# Patient Record
Sex: FEMALE | Race: OTHER | HISPANIC OR LATINO | URBAN - METROPOLITAN AREA
[De-identification: names, ages, dates, MRNs, and addresses within clinical notes are randomized per-mention and may not be internally consistent; named-entity substitution may affect disease eponyms.]

---
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
## 2021-07-11 ENCOUNTER — EMERGENCY (EMERGENCY)
Facility: HOSPITAL | Age: 21
LOS: 0 days | Discharge: HOME | End: 2021-07-11
Attending: EMERGENCY MEDICINE
Payer: OTHER MISCELLANEOUS

## 2021-07-11 VITALS
TEMPERATURE: 98 F | RESPIRATION RATE: 17 BRPM | SYSTOLIC BLOOD PRESSURE: 111 MMHG | OXYGEN SATURATION: 99 % | DIASTOLIC BLOOD PRESSURE: 66 MMHG | HEART RATE: 70 BPM

## 2021-07-11 VITALS — HEIGHT: 62 IN | WEIGHT: 115.08 LBS

## 2021-07-11 DIAGNOSIS — S61.210A LACERATION WITHOUT FOREIGN BODY OF RIGHT INDEX FINGER WITHOUT DAMAGE TO NAIL, INITIAL ENCOUNTER: ICD-10-CM
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
## 2021-07-11 PROCEDURE — 12001 RPR S/N/AX/GEN/TRNK 2.5CM/<: CPT

## 2021-07-11 PROCEDURE — 99283 EMERGENCY DEPT VISIT LOW MDM: CPT | Mod: 25

## 2021-07-11 PROCEDURE — 73130 X-RAY EXAM OF HAND: CPT | Mod: RT

## 2021-07-11 PROCEDURE — 73130 X-RAY EXAM OF HAND: CPT | Mod: 26,RT

## 2021-07-11 NOTE — ED ADULT TRIAGE NOTE - CHIEF COMPLAINT QUOTE
patient presents with approx. 3cm laceration to the R. 1st finger after dropping a cup, no active bleeding in triage. UTD on tetanus

## 2021-07-11 NOTE — ED STATDOCS - PROGRESS NOTE DETAILS
X-ray of right hand index finger no fracture. Nedra HUNT Repair of laceration done and documented on procedure note. Agreed to f/U with PMD in 7 days for suture removal. return instructions for any signs of infection. ( used ) Nedra HUNT

## 2021-07-11 NOTE — ED STATDOCS - OBJECTIVE STATEMENT
19 y/o female with no pertinent PMHx presents to ED c/o laceration to 2nd right digit s/p broken glass falling on hand. Tetanus not UTD. States glass fell on finger around 3PM, still has difficulty controlling bleeding. NKDA.

## 2021-07-11 NOTE — ED ADULT NURSE NOTE - OBJECTIVE STATEMENT
Indication: Dementia. Mental status change.



Comparison: CT brain of one day prior.



Technique: ybuy Optima 1.5 Jesica TV258Y with GEM suite.  MRI brain without contrast. 



Report: Diffusion series is negative for acute or subacute ischemia. Susceptibility series

is negative for stigmata of hemosiderin deposition to indicate previous hemorrhage.

Moderate prominence of the cerebral sulci reflecting volume loss. Unremarkable ventricles

and basal cisterns.



Increased T2 signal in the periventricular and subcortical white matter without mass

effect most consistent with chronic small vessel ischemic disease. No intra or extra-axial

fluid collection evident. Unremarkable orbital contents.



Preserved major intracranial flow-voids.



Indolent thickening of the inner table of the frontal bone. No suspicious calvarial or

skull base fractures evident. Clear paranasal sinuses and mastoid air spaces. Unremarkable

scalp.



IMPRESSION: Involutional change and stigmata of chronic small vessel ischemic disease. No

evidence for acute or subacute ischemia or other acute intracranial process. Patient presents to ED c/o laceration to 2nd right digit s/p broken glass falling on hand. Tetanus not UTD. States glass fell on finger around 3PM, still has difficulty controlling bleeding.

## 2021-07-11 NOTE — ED STATDOCS - SKIN, MLM
skin normal color for race, warm, dry and intact. +2cm laceration to mid phalanx of right hand 2nd digit, able to flex and extend, normal sensation

## 2021-07-11 NOTE — ED STATDOCS - NSFOLLOWUPINSTRUCTIONS_ED_ALL_ED_FT
Impacted Molar       Molars are the teeth in the back of the mouth. When they push out from the gum and grow (erupt), they can become trapped inside the gum, or they may only partially come through the gum surface (become impacted).    Molars erupt at different times in life. The first set of molars usually erupts at about 6–7 years of age. The second set of molars typically erupts at about 11–13 years of age. The third set of molars usually erupts at about 17–21 years of age. This set of molars is sometimes referred to as wisdom teeth. [place] teeth are most often affected by this condition, although any molar or set of molars can become impacted.  Impacted molars may increase your risk of developing:  •Infection.      •Damage to nearby teeth.      •Growth of fluid-filled sacs (cysts).      •Long-lasting (chronic) discomfort.      •Inflammation of the surrounding gum tissue (pericoronitis).        What are the causes?    Common causes of this condition include having crowded teeth or a small mouth. This means that there may not be space for the molar to grow into. Other causes include a cyst or a mass (tumor).      What are the signs or symptoms?  Symptoms of this condition include:  •Pain.      •Inflammation near the impacted tooth or teeth.      •A stiff jaw.      •Bad breath.      •A gap between the teeth.      •Difficulty opening your mouth.      •A headache or jaw ache.      •Swollen lymph nodes.      •A bad taste in your mouth.      In some cases, there are no symptoms.      How is this diagnosed?    This condition can be diagnosed with an oral exam and X-rays.      How is this treated?  This condition is often treated by removing (extracting) the impacted molar or molars. Other treatment options include:  •A procedure to remove the gum tissue that covers the impacted molar.      •Repositioning the teeth so there is room for the molar to come through. This may be done with orthodontic devices, such as braces.      •Antibiotic medicines, if your impacted molar or set of molars has become infected.      •Medicines for pain, if needed.      Treatment may not be needed if you have no symptoms. Talk with your health care provider about what is best for you.      Follow these instructions at home:       Medicines      •Take over-the-counter and prescription medicines only as told by your health care provider.      •If you were prescribed an antibiotic medicine, take it as told by your health care provider. Do not stop taking the antibiotic even if you start to feel better.    •If you are taking prescription pain medicine, take actions to prevent or treat constipation. Your health care provider may recommend that you:  •Drink enough fluid to keep your urine pale yellow.      •Eat foods that are high in fiber, such as fresh fruits and vegetables, whole grains, and beans.      •Limit foods that are high in fat and processed sugars, such as fried or sweet foods.      •Take an over-the-counter or prescription medicine for constipation.        General instructions   •If directed, put ice on the painful area:  •Put ice in a plastic bag.      •Place a towel between your skin and the bag.      •Leave the ice on for 20 minutes, 2–3 times a day.      •Your health care provider may recommend that you rinse your mouth 3–4 times a day to help with pain, infection, or inflammation. You may use:  •An antibacterial solution. Use it as told by your health care provider.      •A salt-water rinse. To make a salt-water mixture, completely dissolve ½–1 tsp of salt in 1 cup of warm water.        •Keep all follow-up visits as told by your health care provider. This is important.        Contact a health care provider if you have:    •A fever.      •Symptoms that get worse.      •Pain that is not controlled with medicine.    •New symptoms, including:  •New facial swelling or numbness.      •Swelling along your gums.        •Difficulty opening your mouth.      •Difficulty swallowing.        Summary    •Molars are the teeth in the back of the mouth. When they push out from the gum and grow, they can become trapped inside the gum, or they may only partially come through the gum surface (become impacted).      •Impacted molars may increase the risk of developing infection, damage to nearby teeth, growth of fluid-filled sacs (cysts), chronic pain, or inflammation of the surrounding gum tissue.      •Common causes of this condition include having crowded teeth, a small mouth, a cyst, or a mass (tumor).      •This condition is often treated by removing (extracting) the impacted molar or molars. Other options include removing the gum tissue that covers the impacted molar, repositioning the teeth with braces, or taking antibiotic medicine for infection.      This information is not intended to replace advice given to you by your health care provider. Make sure you discuss any questions you have with your health care provider.    Rest. Elevate affected finger.  Change bandage daily. Clean with water then apply bacitracin ointment.  Follow up with PMD or return to ED for suture removal in 7 days. Cuidado de un desgarro, en adultos    Laceration Care, Adult      Un desgarro es un benny que puede atravesar todas las capas de la piel hasta el tejido que se encuentra debajo de la piel. Algunos desgarros cicatrizan por sí solos. Otros se deben cerrar con puntos (suturas), grapas, tiras adhesivas para la piel o goma para cerrar la piel. El cuidado adecuado de un desgarro reduce el riesgo de infección, ayuda a que el desgarro cierre mejor, y puede prevenir la formación de cicatrices.      Cómo cuidar del desgarro    Lávese las rani con agua y jabón antes de tocarse la herida y cambiar la venda (vendaje). Use desinfectante para rani si no dispone de agua y jabón.    Mantenga la herida limpia y seca.    Si le colocaron un vendaje, cámbielo al menos john vez al día o amaury se lo haya indicado el médico. También debe cambiarlo si se moja o se ensucia.    Si se utilizaron suturas o grapas:     •Mantenga la herida completamente seca kev las primeras 24 horas o amaury se lo haya indicado el médico. Transcurrido denae tiempo, puede ducharse o meredith delia de inmersión. No obstante, asegúrese de no sumergir la herida en agua hasta que le hayan quitado las suturas o las grapas.    •Limpie la herida john vez por día o amaury se lo haya indicado el médico:  •Lave la herida con agua y jabón.      •Enjuáguela con agua para quitar todo el jabón.      •Séquela dando palmaditas con john toalla limpia. No frote la herida.        •Después de limpiar la herida, aplique john delgada capa de ungüento con antibiótico amaury se lo haya indicado el médico. [facility] ayudará a prevenir infecciones y a evitar que el vendaje se adhiera a la herida.      •Abigail que le retiren las suturas o las grapas amaury se lo haya indicado el médico.      Si se utilizaron tiras adhesivas para la piel:     • No deje que las tiras adhesivas para la piel se mojen. Puede bañarse o ducharse, brynn tenga cuidado de no mojar la herida.      •Si se moja, séquela dando palmaditas con john toalla limpia. No frote la herida.      •Las tiras adhesivas para la piel se caen solas. Puede recortar las tiras a medida que la herida se denton. No quite las tiras adhesivas para la piel que aún estén pegadas a la herida. Estas se caerán cuando sea el momento.      Si se utilizó goma para cerrar la piel:     •Trate de mantener la herida seca; sin embargo, puede mojarla ligeramente cuando se bañe o se duche. No sumerja la herida en el agua, por ejemplo, al nadar.      •Después de ducharse o bañarse, seque la herida con cuidado dando palmaditas con john toalla limpia. No frote la herida.      • No practique actividades que lo marielos transpirar mucho hasta que la goma para cerrar la piel se haya caído kedar.      • No aplique líquidos, cremas ni ungüentos medicinales en la herida mientras todavía tenga la goma para cerrar la piel. De lo contrario, puede hacer que la goma se despegue antes de que la herida cicatrice.      •Si la herida está cubierta con un vendaje, tenga cuidado de no aplicar cinta adhesiva directamente sobre la goma para cerrar la piel. De lo contrario, puede hacer que la goma se despegue antes de que la herida cicatrice.      • No toque la goma. La goma para cerrar la piel generalmente permanece sobre la piel de 5 a 10 días y luego se  kedar.        Indicaciones generales      •[place] los medicamentos de venta breanna y los recetados solamente amaury se lo haya indicado el médico.      •Si le recetaron un medicamento o ungüento con antibiótico, tómelo o aplíqueselo amaury se lo haya indicado el médico. No deje de usarlo aunque la afección mejore.      • No se rasque ni se toque la herida.    •Controle la herida todos los días para detectar signos de infección. Esté atento a lo siguiente:  •Dolor, hinchazón o enrojecimiento.      •Líquido, dino o pus.        •Kev las primeras 24 a 48 horas después de que le hayan reparado el desgarro, cuando esté sentado o acostado, levante (eleve) la jaron de la lesión por encima del nivel del corazón.    •Si se lo indican, aplique hielo sobre la jaron afectada:  •Ponga el hielo en john bolsa plástica.      •Coloque john toalla entre la piel y la bolsa de hielo.      •Coloque el hielo kev 20 minutos, 2 a 3 veces por día.        •Concurra a todas las visitas de seguimiento amaury se lo haya indicado el médico. [facility] es importante.        Comuníquese con un médico si:    •Le colocaron la vacuna antitetánica y tiene hinchazón, dolor intenso, enrojecimiento o hemorragia en el sitio de la inyección.      •Tiene fiebre.      •John herida que estaba cerrada y se abre.      •Percibe que sale mal olor de la herida o del vendaje.      •Nota un cuerpo extraño en la herida, amaury un trozo de resendiz o jamee.      •El dolor no se ezequiel con los medicamentos.      •Presenta un aumento del enrojecimiento, la hinchazón o el dolor en el lugar de la herida.      •Presenta líquido, dino o pus que provienen de la herida.      •Debe cambiar el vendaje con frecuencia debido al drenaje de líquido, dino o pus proveniente de la herida.      •Presenta john nueva erupción cutánea.      •Presenta adormecimiento alrededor de la herida.        Solicite ayuda de inmediato si:    •Tiene mucha hinchazón alrededor de la herida.      •El dolor aumenta repentinamente y es intenso.      •Presenta nódulos dolorosos cerca de la herida o en la piel en cualquier jaron del cuerpo.      •Tiene john línea jazz que sale de la herida.      •La herida está en la mano o en el pie y no puede  correctamente zeina de los dedos.      •La herida está en la mano o en el pie y observa que los dedos tienen un carlee pálido o azulado.        Resumen    •Un desgarro es un benny que puede atravesar todas las capas de la piel hasta el tejido que se encuentra debajo de la piel.      •Algunos desgarros cicatrizan por sí solos. Otros se deben cerrar con puntos (suturas), grapas, tiras adhesivas para la piel o goma para cerrar la piel.      •El cuidado adecuado de un desgarro reduce el riesgo de infección, ayuda a que el desgarro cierre mejor, y previene la formación de cicatrices.      Esta información no tiene amaury fin reemplazar el consejo del médico. Asegúrese de hacerle al médico cualquier pregunta que tenga.          Rest. Elevate affected finger.  Change bandage daily. Clean with water then apply bacitracin ointment.  Follow up with PMD or return to ED for suture removal in 7 days.

## 2021-07-11 NOTE — ED STATDOCS - CARE PROVIDER_API CALL
Luma Rothman)  Family Medicine  [address] [place] [address]  Phone: [phone]  Fax: [phone]  Follow Up Time:

## 2021-07-11 NOTE — ED STATDOCS - PATIENT PORTAL LINK FT
You can access the FollowMyHealth Patient Portal offered by [facility] by registering at the following website: [email or web address]. By joining [employer]s FollowMyHealth portal, you will also be able to view your health information using other applications (apps) compatible with our system.

## 2021-07-19 ENCOUNTER — EMERGENCY (EMERGENCY)
Facility: HOSPITAL | Age: 21
LOS: 0 days | Discharge: LEFT BEFORE DISCHARGE | End: 2021-07-19
Attending: HOSPITALIST
Payer: OTHER MISCELLANEOUS

## 2021-07-19 VITALS
TEMPERATURE: 98 F | HEART RATE: 107 BPM | RESPIRATION RATE: 16 BRPM | DIASTOLIC BLOOD PRESSURE: 73 MMHG | SYSTOLIC BLOOD PRESSURE: 119 MMHG | OXYGEN SATURATION: 97 %

## 2021-07-19 VITALS — HEIGHT: 62 IN | WEIGHT: 134.92 LBS

## 2021-07-19 DIAGNOSIS — S61.210D LACERATION WITHOUT FOREIGN BODY OF RIGHT INDEX FINGER WITHOUT DAMAGE TO NAIL, SUBSEQUENT ENCOUNTER: ICD-10-CM

## 2021-07-19 DIAGNOSIS — Z48.02 ENCOUNTER FOR REMOVAL OF SUTURES: ICD-10-CM

## 2021-07-19 DIAGNOSIS — M79.644 PAIN IN RIGHT FINGER(S): ICD-10-CM

## 2021-07-19 DIAGNOSIS — Z23 ENCOUNTER FOR IMMUNIZATION: ICD-10-CM

## 2021-07-19 PROCEDURE — 87070 CULTURE OTHR SPECIMN AEROBIC: CPT

## 2021-07-19 PROCEDURE — 90471 IMMUNIZATION ADMIN: CPT

## 2021-07-19 PROCEDURE — 99284 EMERGENCY DEPT VISIT MOD MDM: CPT | Mod: 25

## 2021-07-19 PROCEDURE — L9995: CPT

## 2021-07-19 PROCEDURE — 87186 SC STD MICRODIL/AGAR DIL: CPT

## 2021-07-19 PROCEDURE — 73120 X-RAY EXAM OF HAND: CPT | Mod: RT

## 2021-07-19 PROCEDURE — 90715 TDAP VACCINE 7 YRS/> IM: CPT

## 2021-07-19 PROCEDURE — 73120 X-RAY EXAM OF HAND: CPT | Mod: 26,RT

## 2021-07-19 RX ORDER — TETANUS TOXOID, REDUCED DIPHTHERIA TOXOID AND ACELLULAR PERTUSSIS VACCINE, ADSORBED 5; 2.5; 8; 8; 2.5 [IU]/.5ML; [IU]/.5ML; UG/.5ML; UG/.5ML; UG/.5ML
0.5 SUSPENSION INTRAMUSCULAR ONCE
Refills: 0 | Status: COMPLETED | OUTPATIENT
Start: 2021-07-19 | End: 2021-07-19

## 2021-07-19 RX ORDER — AMPICILLIN SODIUM AND SULBACTAM SODIUM 250; 125 MG/ML; MG/ML
3 INJECTION, POWDER, FOR SUSPENSION INTRAMUSCULAR; INTRAVENOUS ONCE
Refills: 0 | Status: DISCONTINUED | OUTPATIENT
Start: 2021-07-19 | End: 2021-07-19

## 2021-07-19 RX ADMIN — Medication 1 TABLET(S): at 21:57

## 2021-07-19 RX ADMIN — TETANUS TOXOID, REDUCED DIPHTHERIA TOXOID AND ACELLULAR PERTUSSIS VACCINE, ADSORBED 0.5 MILLILITER(S): 5; 2.5; 8; 8; 2.5 SUSPENSION INTRAMUSCULAR at 21:56

## 2021-07-19 NOTE — ED ADULT NURSE NOTE - CHIEF COMPLAINT QUOTE
Patient presents to ED requesting to have stiches taken out of her finger. Denies fever or drainage from site.
Yes

## 2021-07-19 NOTE — ED STATDOCS - CLINICAL SUMMARY MEDICAL DECISION MAKING FREE TEXT BOX
flower: here for suture removal - digit appearing Infected, close proximity to joint. given appearance will consult hand, anticipate further intervention required.

## 2021-07-19 NOTE — ED STATDOCS - CARE PLAN
Principal Discharge DX:	Laceration of right index finger without foreign body without damage to nail, subsequent encounter

## 2021-07-19 NOTE — ED STATDOCS - CARE PROVIDER_API CALL
Cleveland Benitez)  Surgery  [address], [address]  [place] [address]  Phone: [phone]  Fax: [phone]  Follow Up Time: 1-3 Days

## 2021-07-19 NOTE — ED STATDOCS - PATIENT PORTAL LINK FT
You can access the FollowMyHealth Patient Portal offered by [facility] by registering at the following website: [email or web address]. By joining [employer]’s FollowMyHealth portal, you will also be able to view your health information using other applications (apps) compatible with our system.

## 2021-07-19 NOTE — ED STATDOCS - PROGRESS NOTE DETAILS
flower pgy3: pt w/ apparent obvious infection of laceration site. 4 sutures removed w/ purulence present at suture hole sites as well as apparent dehiscence of laceration itself. Area erythematous, purulent - concern for poss underlying joint involvement given location on ventral PIP. Spoke to on call hand consult, will get basic labs w/ IV unasyn, anticipate hand evaluation for possible further intervention. flower: hand @ bedside, will I&D. Printers down but verbally discussed consent for procedure w/ risks w/ translation into native French. Patient aware and agreeable. Patient to go home on augmentin, follow up with plastics on wednesday am.

## 2021-07-19 NOTE — ED ADULT TRIAGE NOTE - CHIEF COMPLAINT QUOTE
Patient presents to ED requesting to have stiches taken out of her finger. Denies fever or drainage from site.

## 2021-07-19 NOTE — CONSULT NOTE ADULT - SUBJECTIVE AND OBJECTIVE BOX
s the [id number] right hand dominant female who was working in [employer] on July 11th when she cut her right index finger on a glass cup. She had initially gone to [facility] ED for sutures. She was not discharged on antibiotics and presented today for removal of her sutures. While here she was noted to have erythema and some purulence emanating from her laceration repair site. She also was noted to have wound dehiscence was well. She denies any fever or chills. She otherwise has pain in her finger where her laceration repair was located. Her sutures were removed by the ED and Hand surgery was consulted for evaluation of the infection      PMH: none  PSH: dental procedure  All: NKDA  social: non smoker, occasional Etoh, works in [employer]  meds: none      REVIEW OF SYSTEMS:   · CONSTITUTIONAL: no fever and no chills.  · SKIN: - - -  · Skin [+]: +Redness to right 2nd digit    PHYSICAL EXAM:   · CONSTITUTIONAL: well appearing and in no apparent distress.  · EYES: clear bilaterally.  Pupils equal, round, and reactive to light.  · ENMT: Nasal mucosa clear.  Mouth with normal mucosa  Throat has no vesicles, no oropharyngeal exudates and uvula is midline.  · CARDIAC: normal rate, regular rhythm, and no murmur.  · RESPIRATORY: breath sounds clear and equal bilaterally.  · GASTROINTESTINAL: abdomen soft, non-tender, and non-distended. Bowel sounds present.  · MUSCULOSKELETAL: range of motion is not limited and there is no muscle tenderness.  · NEUROLOGICAL: sensation is normal and strength is normal.  · SKIN: skin normal color for race, warm, dry and intact. +redness and tenderness to her right 2nd digit      Exam  right index finger: laceration along the volar ulnar aspect of the index finger. the laceration is curvilinear in nature and crosses the PIP joint. No numbness in the finger. Decreased flexion of the index finger due to pain. Otherwise finger is pink and warm. Some localized erythema along the laceration site. No obvious purulence in the wound.       Xray of the right hand not interpreted by radiology however on my review shows no evidence of fracture or foreign body in the wound.

## 2021-07-19 NOTE — ED STATDOCS - OBJECTIVE STATEMENT
21 y/o female presents to ED for suture removal from right 2nd digit. States sutures were placed 7 days ago, notes some redness to area and pain.

## 2021-07-19 NOTE — CONSULT NOTE ADULT - ASSESSMENT
21yo s/p laceration repair performed by the ED now complicated by wound infection and wound dehiscence. The wound was inspected and irrigated. Wound culture was obtained from the wound site. No signs of further purulence in the wound bed.   - follow up culture results  - hand elevation  - po abx for 2 weeks  - tetanus vaccination  - removed packing tomorrow and soak in hydrogen peroxide twice daily for 10 minutes   - follow up in 2 days      dictation #[id number]

## 2021-07-19 NOTE — ED STATDOCS - NS_ ATTENDINGSCRIBEDETAILS _ED_A_ED_FT
Rajani Powers MD: The history, relevant review of systems, past medical and surgical history, medical decision making, and physical examination was documented by the scribe in my presence and I attest to the accuracy of the documentation.

## 2021-07-19 NOTE — ED STATDOCS - NSFOLLOWUPINSTRUCTIONS_ED_ALL_ED_FT
Please follow up with your primary care provider for further concerns you may have regarding your general health. Attached you will find your results from today's visit. Continue taking your medications as prescribed and keep your upcoming medical appointments.    Se colocará john puntada en swain laceración. Se colocará algo de embalaje en swain laceración. Mañana, retire el embalaje y, después de hacerlo, sumerja swain mano en peróxido de hidrógeno lise 10 minutos, dos veces (mañana y noche).    Por favor llame mañana por la mañana al número de arriba para solicitar john deidre brendan MIÉRCOLES con el Dr. Benitez.    [place] alicia antibióticos según lo prescrito, dos veces al día lise los próximos 10 lolita.    Cleveland Benitez)  Cirugía  [address], [address]  [place] [address]  Teléfono: [phone]  Fax: [phone]  Tiempo de seguimiento: 1-3 días

## 2021-07-19 NOTE — ED STATDOCS - ATTENDING CONTRIBUTION TO CARE
I, Rajani Powers MD,  performed the initial face to face bedside interview with this patient regarding history of present illness, review of symptoms and relevant past medical, social and family history.  I completed an independent physical examination.  I was the initial provider who evaluated this patient. I have signed out the follow up of any pending tests (i.e. labs, radiological studies) to the resident.  I have communicated the patient’s plan of care and disposition with the resident.  The history, relevant review of systems, past medical and surgical history, medical decision making, and physical examination was documented by the resident in my presence and I attest to the accuracy of the documentation.

## 2021-07-22 LAB
-  AMPICILLIN/SULBACTAM: SIGNIFICANT CHANGE UP
-  CEFAZOLIN: SIGNIFICANT CHANGE UP
-  CLINDAMYCIN: SIGNIFICANT CHANGE UP
-  ERYTHROMYCIN: SIGNIFICANT CHANGE UP
-  GENTAMICIN: SIGNIFICANT CHANGE UP
-  OXACILLIN: SIGNIFICANT CHANGE UP
-  PENICILLIN: SIGNIFICANT CHANGE UP
-  RIFAMPIN: SIGNIFICANT CHANGE UP
-  TETRACYCLINE: SIGNIFICANT CHANGE UP
-  TRIMETHOPRIM/SULFAMETHOXAZOLE: SIGNIFICANT CHANGE UP
-  VANCOMYCIN: SIGNIFICANT CHANGE UP
METHOD TYPE: SIGNIFICANT CHANGE UP

## 2021-07-23 NOTE — ED POST DISCHARGE NOTE - RESULT SUMMARY
+culture with staph aureus. Pt placed on augmentin. Culture shows sensitivity to PCN class. ED treatment appropriate. -Thanh Pfeiffer PA-C

## 2021-07-24 LAB
CULTURE RESULTS: SIGNIFICANT CHANGE UP
ORGANISM # SPEC MICROSCOPIC CNT: SIGNIFICANT CHANGE UP
ORGANISM # SPEC MICROSCOPIC CNT: SIGNIFICANT CHANGE UP
SPECIMEN SOURCE: SIGNIFICANT CHANGE UP

## 2021-09-01 NOTE — ED PROCEDURE NOTE - NUMBER OF RETENTION SUTURES
From: Em Patiño  To: SHERYL Cody - CNP  Sent: 9/1/2021 2:31 PM EDT  Subject: Prescription Question    Hi Mannydigna Heide, I have one dose left of my lipitor (Atorvastatin 40 mg) can you please send that in to [facility]. I will be getting my blood work done very soon, just had a death in the family and getting back to work and all I have been so busy. Doing great, thanks!      Amisha Pahceco
Last office visit 6/14/21
4

## 2022-12-15 PROBLEM — Z00.00 ENCOUNTER FOR PREVENTIVE HEALTH EXAMINATION: Status: ACTIVE | Noted: 2022-12-15

## 2024-09-23 ENCOUNTER — EMERGENCY (EMERGENCY)
Facility: HOSPITAL | Age: 24
LOS: 0 days | Discharge: HOME | End: 2024-09-24
Attending: EMERGENCY MEDICINE
Payer: MEDICAID

## 2024-09-23 VITALS
DIASTOLIC BLOOD PRESSURE: 66 MMHG | SYSTOLIC BLOOD PRESSURE: 104 MMHG | HEART RATE: 95 BPM | OXYGEN SATURATION: 100 % | TEMPERATURE: 98 F | RESPIRATION RATE: 18 BRPM

## 2024-09-23 VITALS — HEIGHT: 63 IN

## 2024-09-23 DIAGNOSIS — N83.11 CORPUS LUTEUM CYST OF RIGHT OVARY: ICD-10-CM

## 2024-09-23 DIAGNOSIS — N83.292 OTHER OVARIAN CYST, LEFT SIDE: ICD-10-CM

## 2024-09-23 DIAGNOSIS — O20.9 HEMORRHAGE IN EARLY PREGNANCY, UNSPECIFIED: ICD-10-CM

## 2024-09-23 DIAGNOSIS — Z3A.08 8 WEEKS GESTATION OF PREGNANCY: ICD-10-CM

## 2024-09-23 DIAGNOSIS — R10.9 UNSPECIFIED ABDOMINAL PAIN: ICD-10-CM

## 2024-09-23 DIAGNOSIS — O34.81 MATERNAL CARE FOR OTHER ABNORMALITIES OF PELVIC ORGANS, FIRST TRIMESTER: ICD-10-CM

## 2024-09-23 LAB
ALBUMIN SERPL ELPH-MCNC: 3.5 G/DL — SIGNIFICANT CHANGE UP (ref 3.3–5)
ALP SERPL-CCNC: 52 U/L — SIGNIFICANT CHANGE UP (ref 40–120)
ALT FLD-CCNC: 18 U/L — SIGNIFICANT CHANGE UP (ref 12–78)
ANION GAP SERPL CALC-SCNC: 6 MMOL/L — SIGNIFICANT CHANGE UP (ref 5–17)
APPEARANCE UR: CLEAR — SIGNIFICANT CHANGE UP
APTT BLD: 29.6 SEC — SIGNIFICANT CHANGE UP (ref 24.5–35.6)
AST SERPL-CCNC: 13 U/L — LOW (ref 15–37)
BACTERIA # UR AUTO: NEGATIVE /HPF — SIGNIFICANT CHANGE UP
BASOPHILS # BLD AUTO: 0.06 K/UL — SIGNIFICANT CHANGE UP (ref 0–0.2)
BASOPHILS NFR BLD AUTO: 0.4 % — SIGNIFICANT CHANGE UP (ref 0–2)
BILIRUB SERPL-MCNC: 0.2 MG/DL — SIGNIFICANT CHANGE UP (ref 0.2–1.2)
BILIRUB UR-MCNC: NEGATIVE — SIGNIFICANT CHANGE UP
BLD GP AB SCN SERPL QL: SIGNIFICANT CHANGE UP
BUN SERPL-MCNC: 6 MG/DL — LOW (ref 7–23)
CALCIUM SERPL-MCNC: 9.3 MG/DL — SIGNIFICANT CHANGE UP (ref 8.5–10.1)
CAST: 0 /LPF — SIGNIFICANT CHANGE UP (ref 0–4)
CHLORIDE SERPL-SCNC: 106 MMOL/L — SIGNIFICANT CHANGE UP (ref 96–108)
CO2 SERPL-SCNC: 24 MMOL/L — SIGNIFICANT CHANGE UP (ref 22–31)
COLOR SPEC: YELLOW — SIGNIFICANT CHANGE UP
CREAT SERPL-MCNC: 0.5 MG/DL — SIGNIFICANT CHANGE UP (ref 0.5–1.3)
DIFF PNL FLD: ABNORMAL
EGFR: 135 ML/MIN/1.73M2 — SIGNIFICANT CHANGE UP
EOSINOPHIL # BLD AUTO: 0.14 K/UL — SIGNIFICANT CHANGE UP (ref 0–0.5)
EOSINOPHIL NFR BLD AUTO: 1 % — SIGNIFICANT CHANGE UP (ref 0–6)
GLUCOSE SERPL-MCNC: 84 MG/DL — SIGNIFICANT CHANGE UP (ref 70–99)
GLUCOSE UR QL: NEGATIVE MG/DL — SIGNIFICANT CHANGE UP
HCG SERPL-ACNC: HIGH MIU/ML
HCT VFR BLD CALC: 39 % — SIGNIFICANT CHANGE UP (ref 34.5–45)
HGB BLD-MCNC: 13 G/DL — SIGNIFICANT CHANGE UP (ref 11.5–15.5)
IMM GRANULOCYTES NFR BLD AUTO: 0.4 % — SIGNIFICANT CHANGE UP (ref 0–0.9)
INR BLD: 0.92 RATIO — SIGNIFICANT CHANGE UP (ref 0.85–1.18)
KETONES UR-MCNC: NEGATIVE MG/DL — SIGNIFICANT CHANGE UP
LEUKOCYTE ESTERASE UR-ACNC: NEGATIVE — SIGNIFICANT CHANGE UP
LYMPHOCYTES # BLD AUTO: 2.67 K/UL — SIGNIFICANT CHANGE UP (ref 1–3.3)
LYMPHOCYTES # BLD AUTO: 20 % — SIGNIFICANT CHANGE UP (ref 13–44)
MCHC RBC-ENTMCNC: 28.2 PG — SIGNIFICANT CHANGE UP (ref 27–34)
MCHC RBC-ENTMCNC: 33.3 GM/DL — SIGNIFICANT CHANGE UP (ref 32–36)
MCV RBC AUTO: 84.6 FL — SIGNIFICANT CHANGE UP (ref 80–100)
MONOCYTES # BLD AUTO: 0.76 K/UL — SIGNIFICANT CHANGE UP (ref 0–0.9)
MONOCYTES NFR BLD AUTO: 5.7 % — SIGNIFICANT CHANGE UP (ref 2–14)
NEUTROPHILS # BLD AUTO: 9.66 K/UL — HIGH (ref 1.8–7.4)
NEUTROPHILS NFR BLD AUTO: 72.5 % — SIGNIFICANT CHANGE UP (ref 43–77)
NITRITE UR-MCNC: NEGATIVE — SIGNIFICANT CHANGE UP
PH UR: 5.5 — SIGNIFICANT CHANGE UP (ref 5–8)
PLATELET # BLD AUTO: 469 K/UL — HIGH (ref 150–400)
POTASSIUM SERPL-MCNC: 3.4 MMOL/L — LOW (ref 3.5–5.3)
POTASSIUM SERPL-SCNC: 3.4 MMOL/L — LOW (ref 3.5–5.3)
PROT SERPL-MCNC: 7.5 GM/DL — SIGNIFICANT CHANGE UP (ref 6–8.3)
PROT UR-MCNC: NEGATIVE MG/DL — SIGNIFICANT CHANGE UP
PROTHROM AB SERPL-ACNC: 10.4 SEC — SIGNIFICANT CHANGE UP (ref 9.5–13)
RBC # BLD: 4.61 M/UL — SIGNIFICANT CHANGE UP (ref 3.8–5.2)
RBC # FLD: 12 % — SIGNIFICANT CHANGE UP (ref 10.3–14.5)
RBC CASTS # UR COMP ASSIST: 8 /HPF — HIGH (ref 0–4)
SODIUM SERPL-SCNC: 136 MMOL/L — SIGNIFICANT CHANGE UP (ref 135–145)
SP GR SPEC: 1.01 — SIGNIFICANT CHANGE UP (ref 1–1.03)
SQUAMOUS # UR AUTO: 5 /HPF — SIGNIFICANT CHANGE UP (ref 0–5)
UROBILINOGEN FLD QL: 0.2 MG/DL — SIGNIFICANT CHANGE UP (ref 0.2–1)
WBC # BLD: 13.35 K/UL — HIGH (ref 3.8–10.5)
WBC # FLD AUTO: 13.35 K/UL — HIGH (ref 3.8–10.5)
WBC UR QL: 2 /HPF — SIGNIFICANT CHANGE UP (ref 0–5)

## 2024-09-23 PROCEDURE — 76817 TRANSVAGINAL US OBSTETRIC: CPT | Mod: 26

## 2024-09-23 PROCEDURE — 36415 COLL VENOUS BLD VENIPUNCTURE: CPT

## 2024-09-23 PROCEDURE — 80053 COMPREHEN METABOLIC PANEL: CPT

## 2024-09-23 PROCEDURE — 85610 PROTHROMBIN TIME: CPT

## 2024-09-23 PROCEDURE — 76817 TRANSVAGINAL US OBSTETRIC: CPT

## 2024-09-23 PROCEDURE — 87591 N.GONORRHOEAE DNA AMP PROB: CPT | Mod: XU

## 2024-09-23 PROCEDURE — 87491 CHLMYD TRACH DNA AMP PROBE: CPT | Mod: XU

## 2024-09-23 PROCEDURE — 87661 TRICHOMONAS VAGINALIS AMPLIF: CPT | Mod: XU

## 2024-09-23 PROCEDURE — 87800 DETECT AGNT MULT DNA DIREC: CPT

## 2024-09-23 PROCEDURE — 87798 DETECT AGENT NOS DNA AMP: CPT | Mod: XU

## 2024-09-23 PROCEDURE — 86900 BLOOD TYPING SEROLOGIC ABO: CPT

## 2024-09-23 PROCEDURE — 85730 THROMBOPLASTIN TIME PARTIAL: CPT

## 2024-09-23 PROCEDURE — 81001 URINALYSIS AUTO W/SCOPE: CPT

## 2024-09-23 PROCEDURE — 99284 EMERGENCY DEPT VISIT MOD MDM: CPT

## 2024-09-23 PROCEDURE — 85025 COMPLETE CBC W/AUTO DIFF WBC: CPT

## 2024-09-23 PROCEDURE — 84702 CHORIONIC GONADOTROPIN TEST: CPT

## 2024-09-23 PROCEDURE — 86901 BLOOD TYPING SEROLOGIC RH(D): CPT

## 2024-09-23 PROCEDURE — 87801 DETECT AGNT MULT DNA AMPLI: CPT

## 2024-09-23 PROCEDURE — 86850 RBC ANTIBODY SCREEN: CPT

## 2024-09-23 PROCEDURE — 87086 URINE CULTURE/COLONY COUNT: CPT

## 2024-09-23 PROCEDURE — 99284 EMERGENCY DEPT VISIT MOD MDM: CPT | Mod: 25

## 2024-09-23 RX ORDER — ACETAMINOPHEN 325 MG/1
650 TABLET ORAL ONCE
Refills: 0 | Status: COMPLETED | OUTPATIENT
Start: 2024-09-23 | End: 2024-09-23

## 2024-09-23 RX ORDER — POTASSIUM CHLORIDE 10 MEQ
40 TABLET, EXT RELEASE, PARTICLES/CRYSTALS ORAL ONCE
Refills: 0 | Status: COMPLETED | OUTPATIENT
Start: 2024-09-23 | End: 2024-09-23

## 2024-09-23 RX ADMIN — Medication 40 MILLIEQUIVALENT(S): at 23:54

## 2024-09-23 RX ADMIN — ACETAMINOPHEN 650 MILLIGRAM(S): 325 TABLET ORAL at 21:08

## 2024-09-23 RX ADMIN — ACETAMINOPHEN 650 MILLIGRAM(S): 325 TABLET ORAL at 23:55

## 2024-09-23 NOTE — ED STATDOCS - NSFOLLOWUPINSTRUCTIONS_ED_ALL_ED_FT
SIGUE A TU MÉDICO EN 1-2 DÍAS. REGRESE A LA ER PARA CUALQUIER SÍNTOMA PENDIENTE O NUEVAS PREOCUPACIONES.    Hemorragia vaginal lise el primer trimestre del embarazo  Vaginal Bleeding During Pregnancy, First Trimester  Lise los primeros meses del embarazo, es común tener john pequeña hemorragia o manchado de la vagina. Algunas pueden estar relacionadas al embarazo y otras no. En muchos casos, la hemorragia es normal y no es un problema. Sin embargo, la hemorragia también puede ser un signo de algo grave.    Cosas normales que pueden causar hemorragias lise el primer trimestre:  Implantación del óvulo fecundado en el revestimiento del útero.  Cambios rápidos en los vasos sanguíneos. Connell se debe a cambios que se producen en el cuerpo lise el embarazo.  Sexo.  Exámenes pélvicos.  Las cosas anormales que pueden causar hemorragias lise el primer trimestre incluyen:  Infección o inflamación del curly uterino.  Crecimientos o pólipos en el curly uterino.  Aborto espontáneo o amenaza de aborto espontáneo.  Embarazo que se desarrolla fuera del útero (embarazo ectópico).  Un óvulo fecundado que se convierte en john masa de tejido (embarazo molar).  Informe al médico de inmediato si tiene algún tipo de hemorragia vaginal.    Siga estas instrucciones en swain casa:  Control de la hemorragia      Controle swain hemorragia.  Esté atenta a cualquier cambio en los síntomas. Informe al médico sobre cualquier inquietud que tenga.  Intente comprender cuándo se produce la hemorragia. ¿La hemorragia comienza por sí kedar, o comienza después de hacer algo, amaury tener relaciones sexuales o someterse a un examen pélvico?  Utilice un diario para registrar las cosas que observa sobre swain hemorragia, amaury:  El tipo de hemorragia que está teniendo. ¿La hemorragia comienza y se detiene de forma irregular o es un flujo magda?  La gravedad del sangrado. ¿La hemorragia es intensa o ligera?  La cantidad de toallas higiénicas que utiliza cada día, con qué frecuencia las cambia y qué herndon empapadas están.  Informe al médico si elimina tejido. Es posible que el médico quiera verlo.  Actividad    Siga las indicaciones del médico respecto de las restricciones en las actividades. Pregunte qué actividades son seguras para usted.  No tenga relaciones sexuales hasta que swain médico le indique que es seguro.  Si es necesario, organícese para que alguien la ayude con las actividades cotidianas.  Indicaciones generales    Use los medicamentos de venta breanna y los recetados solamente amaury se lo haya indicado el médico.  No tome aspirina, ya que puede causar hemorragias.  No use tampones ni se avery duchas vaginales.  Cumpla con todas las visitas de seguimiento. Connell es importante.  Comuníquese con un médico si:  Tiene john hemorragia vaginal en cualquier momento del embarazo.  Tiene calambres o jacque de parto.  Tiene fiebre o escalofríos.  Solicite ayuda de inmediato si:  Tiene cólicos intensos en la espalda o el abdomen.  Elimina coágulos grandes o john gran cantidad de tejido por la vagina.  La hemorragia aumenta.  Se siente mareada, débil, o se desmaya.  Tiene john pérdida importante o sale líquido a borbotones por la vagina.  Resumen  Lise los primeros meses del embarazo, es común tener john pequeña hemorragia de la vagina.  Debe informar a swain médico de inmediato si tiene alguna hemorragia vaginal.  Intente comprender por qué se produce la hemorragia. ¿La hemorragia se produce por sí kedar o se manifiesta después de hacer algo, amaury tener relaciones sexuales o someterse a un examen pélvico?  Cumpla con todas las visitas de seguimiento. Connell es importante.  Esta información no tiene amaury fin reemplazar el consejo del médico. Asegúrese de hacerle al médico cualquier pregunta que tenga.

## 2024-09-23 NOTE — ED ADULT NURSE NOTE - OBJECTIVE STATEMENT
Pt c/o vaginal bleeding with clots, hip and back pain x 2 hours. Pt is . Unknown amount of weeks, pt hasn't had ultrasound. AO x 4.

## 2024-09-23 NOTE — ED STATDOCS - OBJECTIVE STATEMENT
23 y/o female G2 with no pertinent PMHx presents to the ED c/o suprapubic abdominal pain x2 days and vaginal bleeding onset today while at work. LMP 7/15-7/20. She recently received the Depo contraceptive injection however only afterward tested positive for pregnancy. Has not yet established care with an OB.

## 2024-09-23 NOTE — ED STATDOCS - PROGRESS NOTE DETAILS
signed Saloni Mendosa PA-C Pt seen initially in intake by Dr Pereira. Pt declines  services.  24F c/o 1 episode of vaginal bleeding at work today, pt says it was like a "gush", but no bleeding since then. Pt c/o some suprapubic discomfort. No urinary symptoms. Pt got depo shot at the end of July, says she bought it from someone. She went to [facility] around 9/10 for vaginal discharge and was given rx for a medication, pill and vaginal medication. pt unsure what medication and for what. She started to take it on 9/15  for 3 days and then tested positive at home for pregnancy on 9/18 and stopped the medication. Pt took home pregnancy test because she was feeling nauseous and her boyfriend recommended the idea. Pt denies current vaginal symptoms but consents to vaginal exam at this time. signed Saloni Mendosa PA-C Pt seen initially in intake by Dr Pereira. Pt declines  services.  24F c/o 1 episode of vaginal bleeding at work today, pt says it was like a "gush", but no bleeding since then. Pt c/o some suprapubic discomfort. No urinary symptoms. Pt got depo shot at the end of July, says she bought it from someone. She went to [facility] around 9/10 for vaginal discharge and was given rx for a medication, pill and vaginal medication. pt unsure what medication and for what. She started to take it on 9/15  for 3 days and then tested positive at home for pregnancy on 9/18 and stopped the medication. Pt took home pregnancy test because she was feeling nauseous and her boyfriend recommended the idea. Pt denies current vaginal symptoms but consents to vaginal exam at this time. No external lesions. speculum exam with some blood in canal, no discharge or odor. vaginal swabs sent. Pt unable to tolerate manual exam due to pain at introitus. Pt says sex is usually painful for her as well. Pt says sex is consensual  when she engages in it. Advise pt pelvic rest, f/u 2 days for repeat lab and sono. return precautions given. Pt feeling well, pt and family agree with DC and plan of care. No abx at this time

## 2024-09-23 NOTE — ED ADULT NURSE NOTE - NSFALLUNIVINTERV_ED_ALL_ED
Bed/Stretcher in lowest position, wheels locked, appropriate side rails in place/Call bell, personal items and telephone in reach/Instruct patient to call for assistance before getting out of bed/chair/stretcher/Non-slip footwear applied when patient is off stretcher/[place] to call system/Physically safe environment - no spills, clutter or unnecessary equipment/Purposeful proactive rounding/Room/bathroom lighting operational, light cord in reach

## 2024-09-23 NOTE — ED STATDOCS - NSFOLLOWUPCLINICS_GEN_ALL_ED_FT
[facility] [address] [place] [address]  Phone: [phone]  Fax:

## 2024-09-23 NOTE — ED STATDOCS - PATIENT PORTAL LINK FT
You can access the FollowMyHealth Patient Portal offered by [facility] by registering at the following website: [email or web address]. By joining [employer]’s FollowMyHealth portal, you will also be able to view your health information using other applications (apps) compatible with our system.

## 2024-09-23 NOTE — ED STATDOCS - CLINICAL SUMMARY MEDICAL DECISION MAKING FREE TEXT BOX
Patient with labs demonstrating mild leukocytosis 13, CMP with mild hypokalemia 3.4, hCG is 1 48,000, UA with large blood, negative for nitrite, negative for leukocytes, negative for bacteria, ultrasound of the pelvis demonstrates a single live IUP at 9 weeks, 0 days.  Corpus luteal cyst, and septated cyst of the right and left ovary respectively also visualized on ultrasound.  Vaginal swabs performed by SARANYA Luis, and sent to lab.  We discussed with OB, states that patient may follow-up at the [facility].  Patient to be discharged in good condition.  Strict return precautions given for any worsening.

## 2024-09-23 NOTE — ED ADULT TRIAGE NOTE - CHIEF COMPLAINT QUOTE
pt presented to er c/o vaginal bleeding x2 hours. pt is  unknown weeks (LMP around 7/15). endorsing hip, back pain and dark red blood for about 2 hours. unknown amount of blood. no meds prior to arrival. NKDA.

## 2024-09-23 NOTE — ED STATDOCS - PHYSICAL EXAMINATION
Signed in outbox   Patient awake, alert, orient x 3, no acute distress  Heart sounds within normal limits, regular rate rhythm, no murmur  Lungs are clear bilaterally  Mild pelvic TTP.  Extremities are well-perfused  Skin without rash

## 2024-09-24 LAB
CANDIDA AB TITR SER: SIGNIFICANT CHANGE UP
CULTURE RESULTS: SIGNIFICANT CHANGE UP
G VAGINALIS DNA SPEC QL NAA+PROBE: SIGNIFICANT CHANGE UP
SPECIMEN SOURCE: SIGNIFICANT CHANGE UP
T VAGINALIS SPEC QL WET PREP: SIGNIFICANT CHANGE UP

## 2024-09-26 LAB
A VAGINAE DNA VAG QL NAA+PROBE: SIGNIFICANT CHANGE UP
BVAB2 DNA VAG QL NAA+PROBE: SIGNIFICANT CHANGE UP
C ALBICANS DNA VAG QL NAA+PROBE: NEGATIVE — SIGNIFICANT CHANGE UP
C GLABRATA DNA VAG QL NAA+PROBE: NEGATIVE — SIGNIFICANT CHANGE UP
C KRUSEI DNA VAG QL NAA+PROBE: NEGATIVE — SIGNIFICANT CHANGE UP
C LUSITANIAE DNA VAG QL NAA+PROBE: NEGATIVE — SIGNIFICANT CHANGE UP
C TRACH RRNA SPEC QL NAA+PROBE: NEGATIVE — SIGNIFICANT CHANGE UP
CANDIDA DNA VAG QL NAA+PROBE: NEGATIVE — SIGNIFICANT CHANGE UP
MEGA1 DNA VAG QL NAA+PROBE: SIGNIFICANT CHANGE UP
N GONORRHOEA RRNA SPEC QL NAA+PROBE: NEGATIVE — SIGNIFICANT CHANGE UP
T VAGINALIS RRNA SPEC QL NAA+PROBE: NEGATIVE — SIGNIFICANT CHANGE UP

## 2024-10-14 ENCOUNTER — APPOINTMENT (OUTPATIENT)
Dept: ANTEPARTUM | Facility: CLINIC | Age: 24
End: 2024-10-14
Payer: MEDICAID

## 2024-10-14 ENCOUNTER — ASOB RESULT (OUTPATIENT)
Age: 24
End: 2024-10-14

## 2024-10-14 ENCOUNTER — LABORATORY RESULT (OUTPATIENT)
Age: 24
End: 2024-10-14

## 2024-10-14 PROCEDURE — 76813 OB US NUCHAL MEAS 1 GEST: CPT

## 2024-10-14 PROCEDURE — 36416 COLLJ CAPILLARY BLOOD SPEC: CPT

## 2024-11-18 ENCOUNTER — APPOINTMENT (OUTPATIENT)
Dept: ANTEPARTUM | Facility: CLINIC | Age: 24
End: 2024-11-18
Payer: MEDICAID

## 2024-11-18 DIAGNOSIS — O35.9XX0 MATERNAL CARE FOR (SUSPECTED) FETAL ABNORMALITY AND DAMAGE, UNSPECIFIED, NOT APPLICABLE OR UNSPECIFIED: ICD-10-CM

## 2024-11-18 PROCEDURE — 36415 COLL VENOUS BLD VENIPUNCTURE: CPT

## 2024-11-26 LAB
1ST TRIMESTER SCN+NT PATIENT-IMP: NORMAL
AFP INTERP SERPL-IMP: NORMAL
AFP INTERP SERPL-IMP: NORMAL
AFP MOM CUT-OFF: 2.5
AFP MOM SERPL: 0.75
AFP PERCENTILE: 19.1
AFP SERPL-ACNC: 31.82 NG/ML
AGE AT DELIVERY: 23.7
B-HCG FREE MOM PRCTL SERPL: 16.7
B-HCG FREE MOM SERPL: 0.56
B-HCG FREE SERPL-MCNC: 7.63 NG/ML
CARBAMAZEPINE?: NO
COLLECT DATE: NORMAL
CURRENT SMOKER: NO
DIABETES STATUS PATIENT: NO
FET CRL US.MEAS: 56 MM
FET NASAL BN: PRESENT
FET NUCHAL FOLD MOM THICKNESS US.MEAS: 2 MM
FET TS 18 PRIOR RISK FROM MAT AGE: NORMAL
FET TS 21 RISK FROM MAT AGE: NORMAL
GA: NORMAL
HX OF NTD NARR: NORMAL
HX OF TRISOMY 21 QL: NO
INHIBIN A ADJ MOM SERPL: 0.14
INHIBIN PERCENTILE: 0
INHIBIN SERPL-MCNC: <20.9 PG/ML
MATERNAL RISK FACTORS: NORMAL
MULTIPLE PREGNANCY: NORMAL
NEURAL TUBE DEFECT RISK FETUS: NORMAL
NEURAL TUBE DEFECT RISK POP: NORMAL
NUCHAL  TRANSLUCENCY  MOM: 1.37
PAPP-A ADJ MOM SERPL: 1.45
PAPP-A MOM PRCTL SERPL: 75.1
PAPP-A SERPL-ACNC: 5810 MIU/L
RECOM F/U: NO
TEST PERFORMANCE INFO SPEC: NORMAL
TRISOMY 18+13 RISK FETUS: NORMAL
TS 21 RISK CTO FETUS: NORMAL
TS 21 RISK FETUS: NORMAL
U ESTRIOL ADJ MOM SERPL: 1.23
U ESTRIOL SERPL-SCNC: 2.8 NMOL/L
UNCONJUGATED ESTRIOL PERCENTILE: 64.9
US DATE: NORMAL
VALPROIC ACID?: NORMAL

## 2024-12-10 ENCOUNTER — APPOINTMENT (OUTPATIENT)
Dept: ANTEPARTUM | Facility: CLINIC | Age: 24
End: 2024-12-10
Payer: MEDICAID

## 2024-12-10 ENCOUNTER — ASOB RESULT (OUTPATIENT)
Age: 24
End: 2024-12-10

## 2024-12-10 PROCEDURE — 76817 TRANSVAGINAL US OBSTETRIC: CPT

## 2024-12-10 PROCEDURE — 76805 OB US >/= 14 WKS SNGL FETUS: CPT

## 2024-12-18 NOTE — ED ADULT NURSE NOTE - BREATHING, MLM
"Subjective        Subjective   Karl Lopez is a 70 y.o. male who obtained a laceration 10 days ago, which required closure with 5 staples. Mechanism of injury: fall . He denies pain, redness, or drainage from the wound. His last tetanus was UTD     Objective   /79 (BP Location: Right arm, Patient Position: Sitting)   Pulse 62   Ht 1.727 m (5' 8\")   Wt 76.3 kg (168 lb 3.4 oz)   SpO2 98%   BMI 25.58 kg/m²   Injury exam:  A 1 cm laceration noted on the back of the scalp left side  is healing well, without evidence of infection.    Assessment/Plan   Laceration is healing well, without evidence of infection.    1. 5 staples were removed.  2. Wound care discussed.  3. Follow up as needed.  " Spontaneous, unlabored and symmetrical

## 2025-01-02 NOTE — ED PROVIDER NOTE - CROS ED SKIN ALL NEG
- - - Photo Preface (Leave Blank If You Do Not Want): Photographs were obtained today Detail Level: Zone

## 2025-02-18 ENCOUNTER — ASOB RESULT (OUTPATIENT)
Age: 25
End: 2025-02-18

## 2025-02-18 ENCOUNTER — APPOINTMENT (OUTPATIENT)
Dept: ANTEPARTUM | Facility: CLINIC | Age: 25
End: 2025-02-18
Payer: MEDICAID

## 2025-02-18 PROCEDURE — 76819 FETAL BIOPHYS PROFIL W/O NST: CPT

## 2025-02-18 PROCEDURE — 76816 OB US FOLLOW-UP PER FETUS: CPT

## 2025-04-01 ENCOUNTER — APPOINTMENT (OUTPATIENT)
Dept: ANTEPARTUM | Facility: CLINIC | Age: 25
End: 2025-04-01
Payer: MEDICAID

## 2025-04-01 ENCOUNTER — ASOB RESULT (OUTPATIENT)
Age: 25
End: 2025-04-01

## 2025-04-01 PROCEDURE — 76819 FETAL BIOPHYS PROFIL W/O NST: CPT

## 2025-04-01 PROCEDURE — 76816 OB US FOLLOW-UP PER FETUS: CPT

## 2025-04-23 ENCOUNTER — ASOB RESULT (OUTPATIENT)
Age: 25
End: 2025-04-23

## 2025-04-23 ENCOUNTER — APPOINTMENT (OUTPATIENT)
Age: 25
End: 2025-04-23
Payer: MEDICAID

## 2025-04-23 PROCEDURE — 76819 FETAL BIOPHYS PROFIL W/O NST: CPT

## 2025-04-23 PROCEDURE — 76821 MIDDLE CEREBRAL ARTERY ECHO: CPT

## 2025-05-02 ENCOUNTER — INPATIENT (INPATIENT)
Facility: HOSPITAL | Age: 25
LOS: 4 days | Discharge: HOME | DRG: 955 | End: 2025-05-07
Attending: OBSTETRICS & GYNECOLOGY | Admitting: OBSTETRICS & GYNECOLOGY
Payer: MEDICAID

## 2025-05-02 VITALS
TEMPERATURE: 99 F | DIASTOLIC BLOOD PRESSURE: 76 MMHG | WEIGHT: 162.04 LBS | SYSTOLIC BLOOD PRESSURE: 128 MMHG | HEIGHT: 62 IN | RESPIRATION RATE: 16 BRPM | HEART RATE: 78 BPM

## 2025-05-02 DIAGNOSIS — O26.899 OTHER SPECIFIED PREGNANCY RELATED CONDITIONS, UNSPECIFIED TRIMESTER: ICD-10-CM

## 2025-05-02 LAB
BASOPHILS # BLD AUTO: 0.03 K/UL — SIGNIFICANT CHANGE UP (ref 0–0.2)
BASOPHILS NFR BLD AUTO: 0.3 % — SIGNIFICANT CHANGE UP (ref 0–2)
EOSINOPHIL # BLD AUTO: 0.11 K/UL — SIGNIFICANT CHANGE UP (ref 0–0.5)
EOSINOPHIL NFR BLD AUTO: 1.2 % — SIGNIFICANT CHANGE UP (ref 0–6)
HCT VFR BLD CALC: 31.1 % — LOW (ref 34.5–45)
HGB BLD-MCNC: 10 G/DL — LOW (ref 11.5–15.5)
IMM GRANULOCYTES # BLD AUTO: 0.05 K/UL — SIGNIFICANT CHANGE UP (ref 0–0.07)
IMM GRANULOCYTES NFR BLD AUTO: 0.6 % — SIGNIFICANT CHANGE UP (ref 0–0.9)
LYMPHOCYTES # BLD AUTO: 2.12 K/UL — SIGNIFICANT CHANGE UP (ref 1–3.3)
LYMPHOCYTES NFR BLD AUTO: 23.5 % — SIGNIFICANT CHANGE UP (ref 13–44)
MCHC RBC-ENTMCNC: 27.5 PG — SIGNIFICANT CHANGE UP (ref 27–34)
MCHC RBC-ENTMCNC: 32.2 G/DL — SIGNIFICANT CHANGE UP (ref 32–36)
MCV RBC AUTO: 85.7 FL — SIGNIFICANT CHANGE UP (ref 80–100)
MONOCYTES # BLD AUTO: 0.79 K/UL — SIGNIFICANT CHANGE UP (ref 0–0.9)
MONOCYTES NFR BLD AUTO: 8.7 % — SIGNIFICANT CHANGE UP (ref 2–14)
NEUTROPHILS # BLD AUTO: 5.94 K/UL — SIGNIFICANT CHANGE UP (ref 1.8–7.4)
NEUTROPHILS NFR BLD AUTO: 65.7 % — SIGNIFICANT CHANGE UP (ref 43–77)
NRBC # BLD AUTO: 0 K/UL — SIGNIFICANT CHANGE UP (ref 0–0)
NRBC # FLD: 0 K/UL — SIGNIFICANT CHANGE UP (ref 0–0)
NRBC BLD AUTO-RTO: 0 /100 WBCS — SIGNIFICANT CHANGE UP (ref 0–0)
PLATELET # BLD AUTO: 275 K/UL — SIGNIFICANT CHANGE UP (ref 150–400)
PMV BLD: 11.1 FL — SIGNIFICANT CHANGE UP (ref 7–13)
RBC # BLD: 3.63 M/UL — LOW (ref 3.8–5.2)
RBC # FLD: 14 % — SIGNIFICANT CHANGE UP (ref 10.3–14.5)
WBC # BLD: 9.04 K/UL — SIGNIFICANT CHANGE UP (ref 3.8–10.5)
WBC # FLD AUTO: 9.04 K/UL — SIGNIFICANT CHANGE UP (ref 3.8–10.5)

## 2025-05-02 PROCEDURE — 85027 COMPLETE CBC AUTOMATED: CPT

## 2025-05-02 PROCEDURE — 83605 ASSAY OF LACTIC ACID: CPT

## 2025-05-02 PROCEDURE — 94760 N-INVAS EAR/PLS OXIMETRY 1: CPT

## 2025-05-02 PROCEDURE — 87040 BLOOD CULTURE FOR BACTERIA: CPT

## 2025-05-02 PROCEDURE — 85730 THROMBOPLASTIN TIME PARTIAL: CPT

## 2025-05-02 PROCEDURE — 85610 PROTHROMBIN TIME: CPT

## 2025-05-02 PROCEDURE — 36415 COLL VENOUS BLD VENIPUNCTURE: CPT

## 2025-05-02 PROCEDURE — 86780 TREPONEMA PALLIDUM: CPT

## 2025-05-02 PROCEDURE — 86900 BLOOD TYPING SEROLOGIC ABO: CPT

## 2025-05-02 PROCEDURE — 87086 URINE CULTURE/COLONY COUNT: CPT

## 2025-05-02 PROCEDURE — 59050 FETAL MONITOR W/REPORT: CPT

## 2025-05-02 PROCEDURE — 85025 COMPLETE CBC W/AUTO DIFF WBC: CPT

## 2025-05-02 PROCEDURE — 88307 TISSUE EXAM BY PATHOLOGIST: CPT

## 2025-05-02 PROCEDURE — 80053 COMPREHEN METABOLIC PANEL: CPT

## 2025-05-02 PROCEDURE — 86901 BLOOD TYPING SEROLOGIC RH(D): CPT

## 2025-05-02 PROCEDURE — C1889: CPT

## 2025-05-02 PROCEDURE — 90707 MMR VACCINE SC: CPT

## 2025-05-02 PROCEDURE — 86850 RBC ANTIBODY SCREEN: CPT

## 2025-05-02 RX ORDER — OXYTOCIN-SODIUM CHLORIDE 0.9% IV SOLN 30 UNIT/500ML 30-0.9/5 UT/ML-%
167 SOLUTION INTRAVENOUS
Qty: 30 | Refills: 0 | Status: COMPLETED | OUTPATIENT
Start: 2025-05-02 | End: 2025-05-03

## 2025-05-02 RX ORDER — SODIUM CHLORIDE 9 G/1000ML
1000 INJECTION, SOLUTION INTRAVENOUS
Refills: 0 | Status: DISCONTINUED | OUTPATIENT
Start: 2025-05-02 | End: 2025-05-04

## 2025-05-02 RX ORDER — CITRIC ACID/SODIUM CITRATE 300-500 MG
30 SOLUTION, ORAL ORAL ONCE
Refills: 0 | Status: DISCONTINUED | OUTPATIENT
Start: 2025-05-02 | End: 2025-05-04

## 2025-05-02 NOTE — OB RN PATIENT PROFILE - POST PARTUM DEPRESSION SCREEN OB 3
1. A copy of any of your resulted labs, imaging, and findings have been provided and discussed with you.   2. Follow up with your primary care doctor within 48 hours to assess the symptoms you were seen for in the emergency department and to review all results from your visit. If you don't have a doctor, call [phone] to make an appointment.  3. Schedule follow up with a gastroenterologist   4. Return immediately to the emergency department for new, persistent, or worsening symptoms or signs.
no

## 2025-05-02 NOTE — OB RN PATIENT PROFILE - AS SC BRADEN FRICTION
[Needs SBE Prophylaxis] : [unfilled]  needs bacterial endocarditis prophylaxis. SBE prophylaxis is indicated for dental and invasive ENT procedures. (Circulation. 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  She does not meet criteria for closure.   She complains of palpitations.  Her previous holter was normal.  If she has worsening symptoms, we will repeat the holter.   Birth Control and Pregnancy We discussed importance of choosing appropriate birth control methods. Generally, women with a history of blood clots, heart failure, heart attacks, strokes, uncontrolled hypertension, Eisenmenger syndrome, or pulmonary hypertension should avoid estrogen-based birth control. For those in whom estrogen is contraindicated, alternative options such as IUDs, progestin-only pills, Plan B one-step (levonorgestrel), and long-acting contraceptives like Depo-Provera or Nexplanon are recommended.   We discussed the need for full evaluation prior to pregnancy.    SBE Prophylaxis We also discussed good dental hygiene with routine dental visits every six months. As per ACC guidelines, endocarditis prophylaxis is recommended in those undergoing dental procedures with prosthetic cardiac valves, unrepaired cyanotic heart disease including palliative shunts and conduits, repaired CHD with residual defects at site of adjacent to patch or prosthetic device, repaired CHD with prosthetic material or device during the first 6 months after the procedure and in those patients with a history of infective endocarditis. Amoxicillin 2gm or another appropriate antibiotic should be taken 30 to 60 minutes prior to the procedure.   Heart Healthy Lifestyle We discussed the adoption of a heart-healthy lifestyle, incorporating elements like the Mediterranean diet, weight management, abstaining from smoking/vaping, limiting alcohol intake, and engaging in 30 minutes of daily exercise.   Medical Home Part of today's discussion included identifying a core medical team for as the patient's medical home. It is important that the patient feels comfortable with providers in subspecialties and that the providers are well-versed in the patient's cardiac condition. We can provide referrals to doctors who we have identified as collaborative and conscientious of the patient's complex medical history.     PLAN: -notify us for any prolonged palpitations, persistent chest pain or syncopal events -follow up in 2 years    (3) no apparent problem

## 2025-05-02 NOTE — OB RN PATIENT PROFILE - FALL HARM RISK - UNIVERSAL INTERVENTIONS
Bed in lowest position, wheels locked, appropriate side rails in place/Call bell, personal items and telephone in reach/Instruct patient to call for assistance before getting out of bed or chair/Non-slip footwear when patient is out of bed/[place] to call system/Physically safe environment - no spills, clutter or unnecessary equipment/Purposeful Proactive Rounding/Room/bathroom lighting operational, light cord in reach

## 2025-05-03 LAB — T PALLIDUM AB TITR SER: NEGATIVE — SIGNIFICANT CHANGE UP

## 2025-05-03 RX ORDER — OXYTOCIN-SODIUM CHLORIDE 0.9% IV SOLN 30 UNIT/500ML 30-0.9/5 UT/ML-%
SOLUTION INTRAVENOUS
Qty: 30 | Refills: 0 | Status: DISCONTINUED | OUTPATIENT
Start: 2025-05-03 | End: 2025-05-04

## 2025-05-03 RX ADMIN — OXYTOCIN-SODIUM CHLORIDE 0.9% IV SOLN 30 UNIT/500ML 2 MILLIUNIT(S)/MIN: 30-0.9/5 SOLUTION at 01:10

## 2025-05-03 RX ADMIN — SODIUM CHLORIDE 125 MILLILITER(S): 9 INJECTION, SOLUTION INTRAVENOUS at 01:10

## 2025-05-03 NOTE — OB PROVIDER H&P - HISTORY OF PRESENT ILLNESS
24y  at 40w3d GA by LMP consistent with 1st trimester sono who presents to L&D for elective IOL. Patient denies vaginal bleeding, contractions and leakage of fluid. She endorses good fetal movement. Denies fevers, chills, nausea, vomiting, chest pain, SOB, dizziness and headache. No other complaints at this time.   CIRILO: 25  LMP: 24    Prenatal course is significant for:  polyhydramnios    POB:   uncomplicated  PGYN: -fibroids, -ovarian cysts, denies STD hx, denies abnormal PAPs   PMH: Denies  PSH: Denies  SH: Denies EtOH, tobacco and illicit drug use during this pregnancy; feels safe at home   Meds: PNVs  Allergies: NKDA    Sono: vertex, anterior placenta  EFW: 3900g    T(C): 37.1 (25 @ 23:06), Max: 37.1 (25 @ 23:06)  HR: 78 (25 @ 23:06) (78 - 78)  BP: 128/76 (25 @ 23:06) (128/76 - 128/76)  RR: 16 (25 @ 23:06) (16 - 16)  SpO2: --    Gen: NAD, well-appearing, AAOx3   Abd: Soft, gravid  Ext: non-tender, non-edematous  SSE: defer  SVE:  2/50/-3 soft  Bedside sono: vertex  FHT: baseline 120, moderate variability, +accels, -decels   [place]:  ctx q 5-6min

## 2025-05-03 NOTE — OB PROVIDER H&P - ASSESSMENT
A/P: 24y  at 40w3d GA by LMP consistent with 1st trimester sono who presents to L&D for elective IOL  -Admit to L&D  -Consent  -Admission labs  -NPO, except ice chips   -IV fluids  -IOL at term. Start pitocin  -Fetus: Cat I tracing. Continuous toco and fetal monitoring.   -GBS: Negative, no GBS ppx required   -Analgesia: epi prn    Discussed with Dr. MALHIA Rodríguez

## 2025-05-03 NOTE — OB PROVIDER H&P - ATTENDING COMMENTS
23 yo  at 40 3/7 weeks gestation presents for scheduled elective induction of labor with irregular contractions.  uncomplicated  prior, PNC with [facility], polyhydramnios  EFM:  Cat 1  [place]:  q5min  VE:  2.5cm  A/P:  induction of labor with pitocin  GBS neg  consent obtained.  MALIHA Rodríguez

## 2025-05-03 NOTE — OB PROVIDER H&P - NS_CSECTION_OBGYN_ALL_OB_NU
Telephone Encounter by Kolb-Phalen, Laurie, RN at 07/13/17 02:17 PM     Author:  Kolb-Phalen, Laurie, RN Service:  (none) Author Type:  Registered Nurse     Filed:  07/13/17 02:21 PM Encounter Date:  7/13/2017 Status:  Signed     :  Kolb-Phalen, Laurie, RN (Registered Nurse)            patient with complaints of yeast itching and discharge started one week ago increasing in severity.  patient denies odorous discharge.[LK1.1M]  patient with Hx of yeast and positive vaginosis screening 3-31-17[LK1.2M]  per Dr Rivers order diflucan 150mg x 2, patient to make office visit for evaluation if no resolution of signs and symptoms.[LK1.1M]  Patient notified.  Lucretia verbalized understanding of information given.[LK1.1T]  Rx sent to [facility] in [place] per patient request.[LK1.1M]        Revision History        User Key Date/Time User Provider Type Action    > LK1.2 07/13/17 02:21 PM Kolb-Phalen, Laurie, RN Registered Nurse Sign     LK1.1 07/13/17 02:17 PM Kolb-Phalen, Laurie, RN Registered Nurse     M - Manual, T - Template             0

## 2025-05-03 NOTE — OB PROVIDER H&P - NSLOWPPHRISK_OBGYN_A_OB
No previous uterine incision/[place] Pregnancy/Less than or equal to 4 previous vaginal births/No known bleeding disorder/No history of postpartum hemorrhage/No other PPH risks indicated

## 2025-05-04 LAB
ALBUMIN SERPL ELPH-MCNC: 1.9 G/DL — LOW (ref 3.3–5)
ALP SERPL-CCNC: 218 U/L — HIGH (ref 40–120)
ALT FLD-CCNC: 10 U/L — LOW (ref 12–78)
ANION GAP SERPL CALC-SCNC: 11 MMOL/L — SIGNIFICANT CHANGE UP (ref 5–17)
APTT BLD: 25.8 SEC — LOW (ref 26.1–36.8)
AST SERPL-CCNC: 22 U/L — SIGNIFICANT CHANGE UP (ref 15–37)
BASOPHILS # BLD AUTO: 0.03 K/UL — SIGNIFICANT CHANGE UP (ref 0–0.2)
BASOPHILS NFR BLD AUTO: 0.2 % — SIGNIFICANT CHANGE UP (ref 0–2)
BILIRUB SERPL-MCNC: 0.7 MG/DL — SIGNIFICANT CHANGE UP (ref 0.2–1.2)
BUN SERPL-MCNC: 16 MG/DL — SIGNIFICANT CHANGE UP (ref 7–23)
CALCIUM SERPL-MCNC: 8.6 MG/DL — SIGNIFICANT CHANGE UP (ref 8.5–10.1)
CHLORIDE SERPL-SCNC: 106 MMOL/L — SIGNIFICANT CHANGE UP (ref 96–108)
CO2 SERPL-SCNC: 17 MMOL/L — LOW (ref 22–31)
CREAT SERPL-MCNC: 1.29 MG/DL — SIGNIFICANT CHANGE UP (ref 0.5–1.3)
EGFR: 60 ML/MIN/1.73M2 — SIGNIFICANT CHANGE UP
EGFR: 60 ML/MIN/1.73M2 — SIGNIFICANT CHANGE UP
EOSINOPHIL # BLD AUTO: 0.16 K/UL — SIGNIFICANT CHANGE UP (ref 0–0.5)
EOSINOPHIL NFR BLD AUTO: 0.9 % — SIGNIFICANT CHANGE UP (ref 0–6)
GLUCOSE SERPL-MCNC: 77 MG/DL — SIGNIFICANT CHANGE UP (ref 70–99)
HCT VFR BLD CALC: 30.1 % — LOW (ref 34.5–45)
HGB BLD-MCNC: 10 G/DL — LOW (ref 11.5–15.5)
IMM GRANULOCYTES # BLD AUTO: 0.25 K/UL — HIGH (ref 0–0.07)
IMM GRANULOCYTES NFR BLD AUTO: 1.3 % — HIGH (ref 0–0.9)
INR BLD: 0.91 RATIO — SIGNIFICANT CHANGE UP (ref 0.85–1.16)
LACTATE SERPL-SCNC: 1.7 MMOL/L — SIGNIFICANT CHANGE UP (ref 0.7–2)
LYMPHOCYTES # BLD AUTO: 0.68 K/UL — LOW (ref 1–3.3)
LYMPHOCYTES NFR BLD AUTO: 3.6 % — LOW (ref 13–44)
MCHC RBC-ENTMCNC: 27.9 PG — SIGNIFICANT CHANGE UP (ref 27–34)
MCHC RBC-ENTMCNC: 33.2 G/DL — SIGNIFICANT CHANGE UP (ref 32–36)
MCV RBC AUTO: 84.1 FL — SIGNIFICANT CHANGE UP (ref 80–100)
MONOCYTES # BLD AUTO: 1.22 K/UL — HIGH (ref 0–0.9)
MONOCYTES NFR BLD AUTO: 6.5 % — SIGNIFICANT CHANGE UP (ref 2–14)
NEUTROPHILS # BLD AUTO: 16.3 K/UL — HIGH (ref 1.8–7.4)
NEUTROPHILS NFR BLD AUTO: 87.5 % — HIGH (ref 43–77)
NRBC # BLD AUTO: 0 K/UL — SIGNIFICANT CHANGE UP (ref 0–0)
NRBC # FLD: 0 K/UL — SIGNIFICANT CHANGE UP (ref 0–0)
NRBC BLD AUTO-RTO: 0 /100 WBCS — SIGNIFICANT CHANGE UP (ref 0–0)
PLATELET # BLD AUTO: 249 K/UL — SIGNIFICANT CHANGE UP (ref 150–400)
PMV BLD: 10.9 FL — SIGNIFICANT CHANGE UP (ref 7–13)
POTASSIUM SERPL-MCNC: 3.7 MMOL/L — SIGNIFICANT CHANGE UP (ref 3.5–5.3)
POTASSIUM SERPL-SCNC: 3.7 MMOL/L — SIGNIFICANT CHANGE UP (ref 3.5–5.3)
PROT SERPL-MCNC: 5.6 GM/DL — LOW (ref 6–8.3)
PROTHROM AB SERPL-ACNC: 10.7 SEC — SIGNIFICANT CHANGE UP (ref 9.9–13.4)
RBC # BLD: 3.58 M/UL — LOW (ref 3.8–5.2)
RBC # FLD: 14.1 % — SIGNIFICANT CHANGE UP (ref 10.3–14.5)
SODIUM SERPL-SCNC: 134 MMOL/L — LOW (ref 135–145)
WBC # BLD: 18.64 K/UL — HIGH (ref 3.8–10.5)
WBC # FLD AUTO: 18.64 K/UL — HIGH (ref 3.8–10.5)

## 2025-05-04 PROCEDURE — 88307 TISSUE EXAM BY PATHOLOGIST: CPT | Mod: 26

## 2025-05-04 RX ORDER — ACETAMINOPHEN 500 MG/5ML
1000 LIQUID (ML) ORAL ONCE
Refills: 0 | Status: COMPLETED | OUTPATIENT
Start: 2025-05-04 | End: 2025-05-04

## 2025-05-04 RX ORDER — OXYTOCIN-SODIUM CHLORIDE 0.9% IV SOLN 30 UNIT/500ML 30-0.9/5 UT/ML-%
167 SOLUTION INTRAVENOUS
Qty: 30 | Refills: 0 | Status: DISCONTINUED | OUTPATIENT
Start: 2025-05-04 | End: 2025-05-07

## 2025-05-04 RX ORDER — MODIFIED LANOLIN 100 %
1 CREAM (GRAM) TOPICAL EVERY 6 HOURS
Refills: 0 | Status: DISCONTINUED | OUTPATIENT
Start: 2025-05-04 | End: 2025-05-07

## 2025-05-04 RX ORDER — SIMETHICONE 80 MG
80 TABLET,CHEWABLE ORAL EVERY 4 HOURS
Refills: 0 | Status: DISCONTINUED | OUTPATIENT
Start: 2025-05-04 | End: 2025-05-07

## 2025-05-04 RX ORDER — DIPHENHYDRAMINE HCL 12.5MG/5ML
25 ELIXIR ORAL EVERY 6 HOURS
Refills: 0 | Status: DISCONTINUED | OUTPATIENT
Start: 2025-05-04 | End: 2025-05-07

## 2025-05-04 RX ORDER — PIPERACILLIN-TAZO-DEXTROSE,ISO 2.25G/50ML
3.38 IV SOLUTION, PIGGYBACK PREMIX FROZEN(ML) INTRAVENOUS EVERY 8 HOURS
Refills: 0 | Status: DISCONTINUED | OUTPATIENT
Start: 2025-05-04 | End: 2025-05-06

## 2025-05-04 RX ORDER — PRAMOXINE HCL 1 %
1 GEL (GRAM) TOPICAL EVERY 4 HOURS
Refills: 0 | Status: DISCONTINUED | OUTPATIENT
Start: 2025-05-04 | End: 2025-05-07

## 2025-05-04 RX ORDER — SODIUM CHLORIDE 9 G/1000ML
1000 INJECTION, SOLUTION INTRAVENOUS
Refills: 0 | Status: DISCONTINUED | OUTPATIENT
Start: 2025-05-04 | End: 2025-05-05

## 2025-05-04 RX ORDER — BENZOCAINE 220 MG/G
1 SPRAY, METERED PERIODONTAL EVERY 6 HOURS
Refills: 0 | Status: DISCONTINUED | OUTPATIENT
Start: 2025-05-04 | End: 2025-05-07

## 2025-05-04 RX ORDER — PIPERACILLIN-TAZO-DEXTROSE,ISO 2.25G/50ML
3.38 IV SOLUTION, PIGGYBACK PREMIX FROZEN(ML) INTRAVENOUS ONCE
Refills: 0 | Status: DISCONTINUED | OUTPATIENT
Start: 2025-05-04 | End: 2025-05-04

## 2025-05-04 RX ORDER — MEASLES,MUMPS,RUBELLA LIVE VAC 20000/0.5
0.5 VIAL (EA) SUBCUTANEOUS ONCE
Refills: 0 | Status: COMPLETED | OUTPATIENT
Start: 2025-05-04 | End: 2025-05-07

## 2025-05-04 RX ORDER — WITCH HAZEL LEAF
1 FLUID EXTRACT MISCELLANEOUS EVERY 4 HOURS
Refills: 0 | Status: DISCONTINUED | OUTPATIENT
Start: 2025-05-04 | End: 2025-05-07

## 2025-05-04 RX ORDER — PRENATAL 136/IRON/FOLIC ACID 27 MG-1 MG
1 TABLET ORAL DAILY
Refills: 0 | Status: DISCONTINUED | OUTPATIENT
Start: 2025-05-04 | End: 2025-05-07

## 2025-05-04 RX ORDER — CLOSTRIDIUM TETANI TOXOID ANTIGEN (FORMALDEHYDE INACTIVATED), CORYNEBACTERIUM DIPHTHERIAE TOXOID ANTIGEN (FORMALDEHYDE INACTIVATED), BORDETELLA PERTUSSIS TOXOID ANTIGEN (GLUTARALDEHYDE INACTIVATED), BORDETELLA PERTUSSIS FILAMENTOUS HEMAGGLUTININ ANTIGEN (FORMALDEHYDE INACTIVATED), BORDETELLA PERTUSSIS PERTACTIN ANTIGEN, AND BORDETELLA PERTUSSIS FIMBRIAE 2/3 ANTIGEN 5; 2; 2.5; 5; 3; 5 [LF]/.5ML; [LF]/.5ML; UG/.5ML; UG/.5ML; UG/.5ML; UG/.5ML
0.5 INJECTION, SUSPENSION INTRAMUSCULAR ONCE
Refills: 0 | Status: DISCONTINUED | OUTPATIENT
Start: 2025-05-04 | End: 2025-05-07

## 2025-05-04 RX ORDER — MAGNESIUM HYDROXIDE 400 MG/5ML
30 SUSPENSION ORAL
Refills: 0 | Status: DISCONTINUED | OUTPATIENT
Start: 2025-05-04 | End: 2025-05-07

## 2025-05-04 RX ORDER — PIPERACILLIN-TAZO-DEXTROSE,ISO 2.25G/50ML
3.38 IV SOLUTION, PIGGYBACK PREMIX FROZEN(ML) INTRAVENOUS ONCE
Refills: 0 | Status: COMPLETED | OUTPATIENT
Start: 2025-05-04 | End: 2025-05-04

## 2025-05-04 RX ORDER — HYDROCORTISONE 10 MG/G
1 CREAM TOPICAL EVERY 6 HOURS
Refills: 0 | Status: DISCONTINUED | OUTPATIENT
Start: 2025-05-04 | End: 2025-05-07

## 2025-05-04 RX ORDER — SODIUM CHLORIDE 9 G/1000ML
1000 INJECTION, SOLUTION INTRAVENOUS ONCE
Refills: 0 | Status: COMPLETED | OUTPATIENT
Start: 2025-05-04 | End: 2025-05-04

## 2025-05-04 RX ORDER — DIBUCAINE 10 MG/G
1 OINTMENT TOPICAL EVERY 6 HOURS
Refills: 0 | Status: DISCONTINUED | OUTPATIENT
Start: 2025-05-04 | End: 2025-05-07

## 2025-05-04 RX ADMIN — Medication 400 MILLIGRAM(S): at 11:15

## 2025-05-04 RX ADMIN — SODIUM CHLORIDE 1000 MILLILITER(S): 9 INJECTION, SOLUTION INTRAVENOUS at 11:00

## 2025-05-04 RX ADMIN — Medication 25 GRAM(S): at 13:30

## 2025-05-04 RX ADMIN — Medication 400 MILLIGRAM(S): at 05:08

## 2025-05-04 RX ADMIN — Medication 1000 MILLIGRAM(S): at 06:08

## 2025-05-04 RX ADMIN — Medication 25 GRAM(S): at 21:59

## 2025-05-04 RX ADMIN — OXYTOCIN-SODIUM CHLORIDE 0.9% IV SOLN 30 UNIT/500ML 167 MILLIUNIT(S)/MIN: 30-0.9/5 SOLUTION at 08:19

## 2025-05-04 RX ADMIN — Medication 3 MILLILITER(S): at 21:57

## 2025-05-04 RX ADMIN — Medication 200 GRAM(S): at 05:31

## 2025-05-04 RX ADMIN — SODIUM CHLORIDE 100 MILLILITER(S): 9 INJECTION, SOLUTION INTRAVENOUS at 20:17

## 2025-05-04 NOTE — OB PROVIDER DELIVERY SUMMARY - NSPROVIDERDELIVERYNOTE_OBGYN_ALL_OB_FT
at 0819 of a live male, 4040gm and Apgars pending. Delivered OA, no nuchal cord, heavy meconium fluid. Infant's head delivered with maternal expulsive efforts. Shoulder dystocia noted. Nanda, suprapubic, Stevens, woodscrew attempted. Delivery of posterior arm performed followed  by the rest of the body. Nose and mouth were bulb suctioned. Cord clamped and cut - immediately evaluated by neonatologist - code 100. Samples obtained. Placenta delivered spontaneously, intact, 3VC. Fundus firm, minimal bleeding. 1000mcg rectal cytotec given ppx. Perineum and vagina inspected – 2nd degree perineal laceration repaired with vicryl. EBL 150cc. Hemostasis noted. Pt tolerated procedure well, in stable condition, recovering in LDR. Infant in NICU. Instrument/sponge count correct x 2 and confirmed by nurse.  at 0819 of a live male, 4040gm and Apgars 6/9. Peds present for delivery. Delivered OA, no nuchal cord, heavy meconium fluid. Infant's head delivered with maternal expulsive efforts. Shoulder dystocia called, head delivered at 0817h, body delivered at 0819h. Rodney Vee woodscrew attempted. Delivery of posterior arm performed followed  by the rest of the body. Nose and mouth were bulb suctioned. Cord clamped and cut - immediately evaluated by neonatologist - code 100. Samples obtained. Placenta delivered spontaneously, intact, 3VC, sent to pathology. Fundus firm, minimal bleeding. 1000mcg rectal cytotec given prophylactically. Perineum and vagina inspected – 2nd degree perineal laceration repaired with 2-0 vicryl. EBL 150cc. Hemostasis noted. Uterus firm. Pt tolerated procedure well, in stable condition, recovering in LDR. Infant in NICU. Instrument/sponge count correct x 2 and confirmed by nurse.  at 0819h of a live male, 4040gm and Apgars 6/9. Peds present for delivery. Delivered OA, no nuchal cord, heavy meconium fluid. Infant's head delivered with maternal expulsive efforts. Unable to deliver anterior shoulder. Shoulder dystocia called, head delivered at 0817h, body delivered at 0819h. Nanda, Stevens, woodscrew attempted. Delivery of posterior arm performed followed  by the rest of the body. Nose and mouth were bulb suctioned. Cord clamped and cut - immediately evaluated by neonatologist - code 100. Samples obtained. Placenta delivered spontaneously, intact, 3VC, sent to pathology. Fundus firm, minimal bleeding. 1000mcg rectal cytotec given prophylactically. Perineum and vagina inspected – 2nd degree perineal laceration repaired with 2-0 vicryl. EBL 150cc. Hemostasis noted. Uterus firm. Pt tolerated procedure well, in stable condition, recovering in LDR. Infant in NICU. Instrument/sponge count correct x 2 and confirmed by nurse.

## 2025-05-04 NOTE — OB RN DELIVERY SUMMARY - NS_DELIVERYASSIST1_OBGYN_ALL_OB_FT
Inpatient consult to Palliative Care  Consult performed by: Richar Francis MD  Consult ordered by: Danita Barlow MD          Reason For Consult  Reason for Consult: communication / medical decision making and symptom management     History Of Present Illness  Elizabeth Buckner is a 86 y.o. female with history of right breast carcinoma first diagnosed in 1991 status post breast conserving surgery, radiation and tamoxifen who then went on to have a recurrence in 2012 and had complete mastectomy.  She then had breast cancer in her left breast and had a left mastectomy followed by radiation and trastuzumab.  Finally she had another recurrence in November 2021 and was on anastrozole and palbociclib. She presented to the ED with her  due to confusion and right lower extremity edema though the latter seems to be chronic.  She has been having recurrent falls and is losing weight.  She has leukocytosis with 16.1K with left shift and her chronic anemia is mildly worsened at 10 g/dL hemoglobin from baseline of 11.  She likely has a you TIA with her urinalysis showing positive leukocyte esterase and nitrite.  Metabolic panel notable for mild HASMUKH with creatinine 1.38; she is hypercalcemic at 12.8, ionized calcium is 1.67, and mildly hyponatremic at 133.  CXR without acute findings.  CT head with mass effect at fourth ventricle centrally and recommendation of MRI.  CT abdomen pelvis with extensive retroperitoneal and bilateral pelvic LAD, numerous liver masses possible acute deep vein thrombosis within the right common femoral vein and right external iliac vein, bilateral oral renal lesions.  She also had an abdominal ultrasound showing several hypoechoic masses in her liver.  DVT US of LE with acute DVT from inguinal region to popliteal.  An MRI brain with findings of mild Chiari I type malformation but no acute infarct hemorrhage or mass.  She was being treated for UTI with cefepime and vancomycin and now is just on  "ceftriaxone.  She is full code confirmed by admitting provider.  Of note she was recently diagnosed with a UTI at urgent care started on Macrobid but was not taking this due to side effects on 3/7/2025 her PCP prescribed her ciprofloxacin.    Conversation with  Edwin and niece Jayla:  I filled and family about imaging findings including MRI with Chiari I malformation, acute DVT on right LE, increased pelvic LAD, kidney masses, and liver masses.  We discussed DVT treatment including IVF if MRI of brain had concerning findings or anticoagulation if not.  We discussed her UTI; she probably had not finished her last antibiotic course.   They tell me that she is a fighter and that she would likely want to fight this.  I did not dissuade them.  They requested an earlier PET scan date and I told him I would contact their oncology team but that it would be unlikely to be done earlier and would be done inpatient; they were understanding of this.  Jayla told me that she had not scheduled the PET scan until she met with oncology team last week.  They were unaware of her November CT findings that showed increased pelvic LAD, nor of a recommendation for a PET scan until today.          Symptoms (0 - 10, Best to Worst)  [place] Symptom Assessment System  0-10 (Numeric) Pain Score: 5 - Moderate pain  They report she has had good relief from tylenol. Her RT LE has been hurting with movement. She has been anorexic for some time, eating \"a few bites a day\". She has been c/o abdominal discomfort recently.   She has been \"out of it\" and confused the last week or two.         Personal/Social History    She reports that she has quit smoking. Her smoking use included cigarettes. She has never used smokeless tobacco. She reports that she does not drink alcohol and does not use drugs.    Functional Status          Past Medical History  She has a past medical history of Acute frontal sinusitis, unspecified (11/21/2018), Chronic " kidney disease, stage 2 (mild) (12/20/2018), Combined forms of age-related cataract, left eye (04/12/2019), Combined forms of age-related cataract, right eye (04/12/2019), Dry eye syndrome of left lacrimal gland (04/12/2019), Dry eye syndrome of right lacrimal gland (04/12/2019), Encounter for allergy testing, Encounter for general adult medical examination without abnormal findings (07/24/2020), Encounter for general adult medical examination without abnormal findings (07/19/2021), Encounter for general adult medical examination without abnormal findings (03/06/2018), Other obesity due to excess calories (10/15/2020), Pain in unspecified knee (03/09/2018), Personal history of other diseases of the digestive system (07/10/2018), Personal history of other diseases of the female genital tract (09/08/2016), Personal history of other diseases of the female genital tract, Personal history of other diseases of the nervous system and sense organs (04/12/2019), Personal history of other diseases of the respiratory system (01/02/2020), and Personal history of other diseases of urinary system (01/22/2021).    Surgical History  She has a past surgical history that includes Hysterectomy (03/29/2013); Breast surgery (03/29/2013); and US guided mediastinum percutaneous biopsy (9/22/2021).     Family History  Family History   Problem Relation Name Age of Onset    Cancer Other          Family History    Lung disease Other          Family History     Allergies  Penicillins and Ramipril    Review of Systems     Physical Exam  Constitutional:       Comments: Sleeping   HENT:      Head: Atraumatic.      Comments: Bitemporal muscle wasting.   Cardiovascular:      Rate and Rhythm: Normal rate.      Comments: Extra beats, otherwise regular rhythm. Systolic ejection murmur.   Pulmonary:      Effort: Pulmonary effort is normal.      Breath sounds: Normal breath sounds.   Abdominal:      General: Abdomen is flat. Bowel sounds are normal.       Palpations: Abdomen is soft.      Comments: Grimaced when palpated on right side.    Musculoskeletal:         General: Swelling present.      Comments: Nonpitting edema on RT side. Both legs are large.            Last Recorded Vitals  Blood pressure 147/52, pulse 95, temperature 37 °C (98.6 °F), resp. rate 20, SpO2 99%.    Wt Readings from Last 10 Encounters:   03/10/25 64.6 kg (142 lb 6.7 oz)   03/09/25 68 kg (150 lb)   03/05/25 68.9 kg (152 lb)   01/22/25 73.6 kg (162 lb 4.1 oz)   12/06/24 73.9 kg (163 lb)   11/14/24 73.9 kg (163 lb)   11/13/24 75 kg (165 lb 5.5 oz)   11/08/24 75.9 kg (167 lb 5.3 oz)   07/30/24 76.5 kg (168 lb 10.4 oz)   06/18/24 77.1 kg (170 lb)         Relevant Results  Results for orders placed or performed during the hospital encounter of 03/10/25 (from the past 24 hours)   ECG 12 lead   Result Value Ref Range    Ventricular Rate 99 BPM    Atrial Rate 99 BPM    WI Interval 148 ms    QRS Duration 78 ms    QT Interval 330 ms    QTC Calculation(Bazett) 423 ms    P Axis 43 degrees    R Axis -21 degrees    T Axis 32 degrees    QRS Count 16 beats    Q Onset 224 ms    P Onset 150 ms    P Offset 201 ms    T Offset 389 ms    QTC Fredericia 389 ms   CBC and Auto Differential   Result Value Ref Range    WBC 16.1 (H) 4.4 - 11.3 x10*3/uL    nRBC 0.0 0.0 - 0.0 /100 WBCs    RBC 3.59 (L) 4.00 - 5.20 x10*6/uL    Hemoglobin 10.7 (L) 12.0 - 16.0 g/dL    Hematocrit 32.5 (L) 36.0 - 46.0 %    MCV 91 80 - 100 fL    MCH 29.8 26.0 - 34.0 pg    MCHC 32.9 32.0 - 36.0 g/dL    RDW 13.2 11.5 - 14.5 %    Platelets 174 150 - 450 x10*3/uL    Neutrophils % 79.3 40.0 - 80.0 %    Immature Granulocytes %, Automated 1.1 (H) 0.0 - 0.9 %    Lymphocytes % 7.8 13.0 - 44.0 %    Monocytes % 11.6 2.0 - 10.0 %    Eosinophils % 0.0 0.0 - 6.0 %    Basophils % 0.2 0.0 - 2.0 %    Neutrophils Absolute 12.75 (H) 1.60 - 5.50 x10*3/uL    Immature Granulocytes Absolute, Automated 0.18 0.00 - 0.50 x10*3/uL    Lymphocytes Absolute 1.26 0.80 - 3.00  x10*3/uL    Monocytes Absolute 1.87 (H) 0.05 - 0.80 x10*3/uL    Eosinophils Absolute 0.00 0.00 - 0.40 x10*3/uL    Basophils Absolute 0.03 0.00 - 0.10 x10*3/uL   TSH with reflex to Free T4 if abnormal   Result Value Ref Range    Thyroid Stimulating Hormone 2.10 0.44 - 3.98 mIU/L   Sars-CoV-2 and Influenza A/B PCR   Result Value Ref Range    Flu A Result Not Detected Not Detected    Flu B Result Not Detected Not Detected    Coronavirus 2019, PCR Not Detected Not Detected   Comprehensive metabolic panel   Result Value Ref Range    Glucose 79 74 - 99 mg/dL    Sodium 133 (L) 136 - 145 mmol/L    Potassium 4.0 3.5 - 5.3 mmol/L    Chloride 99 98 - 107 mmol/L    Bicarbonate 25 21 - 32 mmol/L    Anion Gap 13 10 - 20 mmol/L    Urea Nitrogen 31 (H) 6 - 23 mg/dL    Creatinine 1.29 (H) 0.50 - 1.05 mg/dL    eGFR 41 (L) >60 mL/min/1.73m*2    Calcium 12.8 (H) 8.6 - 10.3 mg/dL    Albumin 3.5 3.4 - 5.0 g/dL    Alkaline Phosphatase 54 33 - 136 U/L    Total Protein 7.4 6.4 - 8.2 g/dL    AST 20 9 - 39 U/L    Bilirubin, Total 1.0 0.0 - 1.2 mg/dL    ALT 9 7 - 45 U/L   Magnesium   Result Value Ref Range    Magnesium 1.41 (L) 1.60 - 2.40 mg/dL   Calcium, Ionized   Result Value Ref Range    POCT Calcium, Ionized 1.67 (H) 1.1 - 1.33 mmol/L   Coagulation Screen   Result Value Ref Range    Protime 12.0 9.8 - 12.4 seconds    INR 1.1 0.9 - 1.1    aPTT 21 (L) 26 - 36 seconds   Green Top   Result Value Ref Range    Extra Tube Hold for add-ons.    Urinalysis with Reflex Culture and Microscopic   Result Value Ref Range    Color, Urine Yellow Light-Yellow, Yellow, Dark-Yellow    Appearance, Urine Clear Clear    Specific Gravity, Urine 1.024 1.005 - 1.035    pH, Urine 5.5 5.0, 5.5, 6.0, 6.5, 7.0, 7.5, 8.0    Protein, Urine 20 (TRACE) NEGATIVE, 10 (TRACE), 20 (TRACE) mg/dL    Glucose, Urine Normal Normal mg/dL    Blood, Urine 0.2 (2+) (A) NEGATIVE mg/dL    Ketones, Urine 10 (1+) (A) NEGATIVE mg/dL    Bilirubin, Urine NEGATIVE NEGATIVE mg/dL     Urobilinogen, Urine 2 (1+) (A) Normal mg/dL    Nitrite, Urine 2+ (A) NEGATIVE    Leukocyte Esterase, Urine 250 Jeannette/uL (A) NEGATIVE   Microscopic Only, Urine   Result Value Ref Range    WBC, Urine 21-50 (A) 1-5, NONE /HPF    RBC, Urine 11-20 (A) NONE, 1-2, 3-5 /HPF    Bacteria, Urine 4+ (A) NONE SEEN /HPF    Mucus, Urine FEW Reference range not established. /LPF   CBC and Auto Differential   Result Value Ref Range    WBC 15.0 (H) 4.4 - 11.3 x10*3/uL    nRBC 0.0 0.0 - 0.0 /100 WBCs    RBC 3.32 (L) 4.00 - 5.20 x10*6/uL    Hemoglobin 10.0 (L) 12.0 - 16.0 g/dL    Hematocrit 29.8 (L) 36.0 - 46.0 %    MCV 90 80 - 100 fL    MCH 30.1 26.0 - 34.0 pg    MCHC 33.6 32.0 - 36.0 g/dL    RDW 12.8 11.5 - 14.5 %    Platelets 158 150 - 450 x10*3/uL    Neutrophils % 78.5 40.0 - 80.0 %    Immature Granulocytes %, Automated 0.8 0.0 - 0.9 %    Lymphocytes % 6.7 13.0 - 44.0 %    Monocytes % 13.8 2.0 - 10.0 %    Eosinophils % 0.0 0.0 - 6.0 %    Basophils % 0.2 0.0 - 2.0 %    Neutrophils Absolute 11.74 (H) 1.60 - 5.50 x10*3/uL    Immature Granulocytes Absolute, Automated 0.12 0.00 - 0.50 x10*3/uL    Lymphocytes Absolute 1.00 0.80 - 3.00 x10*3/uL    Monocytes Absolute 2.06 (H) 0.05 - 0.80 x10*3/uL    Eosinophils Absolute 0.00 0.00 - 0.40 x10*3/uL    Basophils Absolute 0.03 0.00 - 0.10 x10*3/uL   Basic metabolic panel   Result Value Ref Range    Glucose 88 74 - 99 mg/dL    Sodium 134 (L) 136 - 145 mmol/L    Potassium 3.7 3.5 - 5.3 mmol/L    Chloride 103 98 - 107 mmol/L    Bicarbonate 24 21 - 32 mmol/L    Anion Gap 11 10 - 20 mmol/L    Urea Nitrogen 27 (H) 6 - 23 mg/dL    Creatinine 1.04 0.50 - 1.05 mg/dL    eGFR 53 (L) >60 mL/min/1.73m*2    Calcium 10.8 (H) 8.6 - 10.3 mg/dL   Magnesium   Result Value Ref Range    Magnesium 1.74 1.60 - 2.40 mg/dL   Protime-INR   Result Value Ref Range    Protime 11.3 9.8 - 12.4 seconds    INR 1.0 0.9 - 1.1     *Note: Due to a large number of results and/or encounters for the requested time period, some results  have not been displayed. A complete set of results can be found in Results Review.     MR brain w and wo IV contrast    Result Date: 3/11/2025  1. No acute infarct, hemorrhage or mass is noted. No abnormalities of the 4th ventricle are noted.   2. The cerebellar tonsils are low-lying consistent with mild Chiari 1 type malformation.   MACRO: None   Signed by: Pete Rice 3/11/2025 12:21 PM Dictation workstation:   [id number]    US abdomen complete    Result Date: 3/11/2025  Obscuration the spleen and right kidney by bowel gas. Previous cholecystectomy.   Mildly small left kidney. The left kidney was otherwise sonographically unremarkable.   There were 3 identifiable hypoechoic solid lesions in the liver. These are nonspecific and could be atypical hemangiomas or metastatic lesions. Recommend liver MRI in follow-up.   MACRO: None   Signed by: Carroll Corley 3/11/2025 8:04 AM Dictation workstation:   [id number]    Lower extremity venous duplex right    Result Date: 3/10/2025  1. Acute deep vein thrombosis at the right lower extremity from the inguinal region to the popliteal region. The right posterior tibial and peroneal veins were not visualized on this exam.     MACRO: Noa Adamson discussed the significance and urgency of this critical finding by telephone with  CYNTHIA HOWARD on 3/10/2025 at 11:24 pm.  (**-RCF-**) Findings:  See findings.     Signed by: Noa Adamson 3/10/2025 11:39 PM Dictation workstation:   [id number]    CT abdomen pelvis wo IV contrast    Result Date: 3/10/2025  1.  Extensive retroperitoneal and bilateral pelvic lymphadenopathy as above concerning for lymphoma or metastatic disease. 2. Suspect acute deep venous thrombosis within the right common femoral vein and right external iliac vein. Correlation with ultrasound findings recommended. 3. Numerous liver masses suggestive of metastatic liver disease. Correlation with ultrasound findings can be performed as clinically warranted. 4.  Bilateral renal lesions, nonspecific in etiology and may represent cysts but incompletely characterized in this unenhanced exam. Correlation with ultrasound findings can be performed as clinically warranted. 5. Air in the bladder which may be due to bladder instrumentation. Correlation with urinalysis recommended. 6. Additional detailed findings as above. Critical Finding:  See findings. Notification was initiated on 3/10/2025 at 7:54 pm by  Esvin Philippe.  (**-YCF-**) Instructions:   7.     Signed by: Esvin Philippe 3/10/2025 7:54 PM Dictation workstation:   [id number]    CT head wo IV contrast    Result Date: 3/10/2025  Mass effect upon the 4th ventricle centrally. Further evaluation with contrast-enhanced MRI is recommended for better assessment.   No evidence for acute cortical infarction or acute intracranial hemorrhage is seen.   MACRO: Critical Finding:  See findings. Notification was initiated on 3/10/2025 at 7:46 pm by  Esvin Philippe.  (**-YCF-**)   Signed by: Esvin Philippe 3/10/2025 7:46 PM Dictation workstation:   [id number]    XR chest 2 views    Result Date: 3/10/2025  No acute process. Signed by Kaamljit Alex MD    CT chest wo IV contrast    Result Date: 2/18/2025  Previous bilateral mastectomy. Presumed mild post radiation scarring anteriorly in each upper lobe subjacent to each mastectomy site.   Development of multiple scattered bilateral lung pleural and parenchymal densities consistent with new metastatic lesions.   Stable right axillary surgical clips. No suspicious thoracic adenopathy in this unenhanced exam.   Mild cardiomegaly.   Calcified hepatic granulomas. Previous cholecystectomy.   Bilateral upper pole renal cysts.   Hepatic flexure colonic diverticulosis..   MACRO: None   Signed by: Carroll Corley 2/18/2025 2:05 PM Dictation workstation:   [id number]    CT head wo IV contrast    Result Date: 2/18/2025  No acute intracranial bleed or focal mass effect.   Mild volume loss.   Mild chronic  white matter ischemic disease in the deep periventricular regions.   MACRO: None   Signed by: Carroll Corley 2/18/2025 1:50 PM Dictation workstation:   [id number]    CT soft tissue neck wo IV contrast    Result Date: 2/18/2025  There are surgical clips noted within the right supraclavicular region. There is nonspecific ill-defined intermediate attenuation noted surrounding the surgical clips with adjacent skin thickening which while nonspecific may be postsurgical in etiology.   There are scattered nonspecific abnormally enlarged lymph nodes noted within the neck. Specifically, there are enlarged level 1B submandibular nodes bilaterally with the largest on the left measuring approximately 23 mm in greatest axial dimension and the largest on the right measuring approximately 20 mm in greatest axial dimension. There is an enlarged left superior clavicular lymph node measuring approximately 18 mm in greatest coronal dimension. There is an adjacent smaller but abnormally enlarged 13 mm left supraclavicular lymph node.   There are nonspecific prominent nodular foci of intermediate attenuation noted within and/or along the margins of the parotid glands bilaterally which while nonspecific raises the possibility of additional scattered prominent parotid and/or periparotid lymph nodes with the largest on the left measuring approximately 17 mm in greatest axial dimension and the largest on the right measuring approximately 13 mm in greatest axial dimension.   There is multilevel cervical spondylosis with the most pronounced posterior osteophytic spurring noted at the C5/6 level.   Please see the dedicated report of the CT of the chest for details of findings within the chest.   MACRO: None.   Signed by: Jefferson Rosales 2/18/2025 12:59 PM Dictation workstation:   [id number]     Scheduled medications  anastrozole, 1 mg, oral, Daily  [Held by provider] aspirin, 81 mg, oral, Daily  carvedilol, 12.5 mg, oral, BID  cefTRIAXone, 1 g,  intravenous, q24h  gabapentin, 300 mg, oral, BID  hydrALAZINE, 100 mg, oral, BID  sennosides, 2 tablet, oral, BID      Continuous medications  sodium chloride 0.9%, 100 mL/hr, Last Rate: 100 mL/hr (03/11/25 9531)      PRN medications  PRN medications: acetaminophen, melatonin, ondansetron ODT **OR** ondansetron    Current Outpatient Medications   Medication Instructions    anastrozole (ARIMIDEX) 1 mg, oral, Daily, Take 1 tablet once daily. Swallow whole with a drink of water.    arm brace (Elbow Compression Sleeve) misc Lymphedema Sleeve  and Gauntlet eval and fit 20-30 mmHG for right  arm    aspirin 81 mg, oral, Daily, Take 1 tablet daily    azelastine (Optivar) 0.05 % ophthalmic solution 2 drops, Both Eyes, Daily    carvedilol (COREG) 12.5 mg, oral, 2 times daily, Take 1 tablet twice daily    cholecalciferol, vitamin D3, (VITAMIN D3 ORAL) 25 mcg, oral, Daily, Take 1 tablet daily Vitamin D 25MCG (1000 UT) oral tablet    ciprofloxacin (CIPRO) 250 mg, oral, 2 times daily    diclofenac sodium (VOLTAREN ARTHRITIS PAIN) 4 g, Topical, 4 times daily    FLAXSEED OIL ORAL 1,200 mg, oral, 3 times daily, Take 1 capsule 3 times daily    fluticasone (Flonase) 50 mcg/actuation nasal spray 1 spray, Each Nostril, 2 times daily, Instill 1 spray in each nostril twice daily    furosemide (LASIX) 20 mg, oral, Daily, Take 1 tablet by mouth every day    gabapentin (NEURONTIN) 300 mg, oral, 3 times daily, Take one capsule by mouth three times a day    gabapentin (NEURONTIN) 300 mg, oral, 3 times daily    hydrALAZINE (APRESOLINE) 100 mg, oral, 2 times daily, Take 1 tablet by mouth twice per day    nitrofurantoin, macrocrystal-monohydrate, (Macrobid) 100 mg capsule 100 mg, oral, 2 times daily    palbociclib (Ibrance) 75 mg tablet Swallow whole.    TURMERIC ORAL 400 mg, oral, Daily, Take 1 capsule daily         Assessment/Plan   IMP: 86-year-old female with history of right breast carcinoma first diagnosed in 1991 status post breast  conserving surgery, radiation and tamoxifen who then went on to have a recurrence in 2012 and had complete mastectomy.  She then had breast cancer in her left breast and had a left mastectomy followed by radiation and trastuzumab.  Finally she had another recurrence in November 2021 and was on anastrozole and palbociclib.  She is presenting with confusion and weakness found to have a UTI.  She has been having recurrent falls and is losing weight.  She likely has a large disease burden in her abdomen including diffuse LAD and several liver masses suspicious for malignancy.  She was due for a PET scan after an 11/20/2024 CT showed possible progression in her pelvic lymph nodes however she did not follow-up.  Palliative consulted for goals of care and symptom management in the setting of likely disease progression.    Issues:  New or recurrent cancer in liver, pelvic LN and kidney  Acute DVT of RT LE  UTI      Plan:  Family provided information on current admission.   They are requesting having the PET scheduled earlier if possible, I will reach out to her oncology team, Nayeli CHURCH  They are awaiting information about DVT treatment, possible biopsy.   Full code for now. Patient was sleeping when I arrived and I did not wake her but spoke with her  and niece  Starting oxycodone 2.5mg p.o. every 4 hours as needed for severe pain  Starting scheduled Tylenol 975 3 times a day.  Agree with continuing her home gabapentin dose.  Will continue to follow            Patient/proxy preference for information  Prefers full information    Goals of Care  Full Code    Is the patient hospice-eligible?   Yes  Was a discussion held re hospice services?   no  Was a decision made re hospice services?  No, patient has been treatment oriented until recently. I will delicately bring up this option on future visits.       I spent 90 minutes in the professional and overall care of this patient.      Richar Francis MD   Alma Buckner MD

## 2025-05-04 NOTE — OB PROVIDER LABOR PROGRESS NOTE - ASSESSMENT
Blood cultures  Urine cultures  CBC, CMP, lactate  Coags  monitor vitals  Start zosyn for suspected sepsis likely secondary to chorioamnionitis
patient received at signout   patient with protracted labor has been 8 cm since h, category II tracing (off pitocin, IV fluids running), chorioamnionitis/sepsis (maternal fever 103, maternal tachycardia) on Zosyn, last fever spike 044h   evaluated patient at bedside to asses need for primary , patient noted to be 9/90/0, OA, asynclitic at 0730h and then progressed to FD/100/0, OA, better engaged at 0737h  patient started pushing at 0738h, category II tracing, late decelerations noted with moderate variability, patient pushing well, with adequate descent  Prepare for delivery, NICU called for delivery, shoulder dystocia/PPH precautions to be taken 
AROM, light meconium  Continue with pitocin  Reexam as indicated
-continue current management   -anticipate  
IUPC placed  Pitocin break  Restart in 1 hour  
VS - chorio/sepsis for mat fever 103, tachycardia on zosyn - continues to be tachycardic  Cat II tracing  IUPC in place - contractions inadequate but unable to uptitrate pitocin due to category II tracing  Will review tracing with attending as patient not making cervical change and with a persistent category II tracing, recommendation for  delivery may be discussed with patient.
continue titrating pitocin as needed  c/w positional changes  thin meconium noted  low baseline, but tracing overall reassuring

## 2025-05-04 NOTE — OB RN DELIVERY SUMMARY - NSSELHIDDEN_OBGYN_ALL_OB_FT
[NS_DeliveryAttending1_OBGYN_ALL_OB_FT:[id number]],[NS_DeliveryAssist1_OBGYN_ALL_OB_FT:[id number]],[NS_DeliveryRN_OBGYN_ALL_OB_FT:[id number]]

## 2025-05-04 NOTE — OB PROVIDER DELIVERY SUMMARY - NSPROCMANEUVERSA_OBGYN_ALL_OB
Suprapubic pressure/Nanda (Legs flexed back)/[employer]/Delivery of posterior arm/[facility] Nanda (Legs flexed back)/[employer]/Delivery of posterior arm/[facility]

## 2025-05-04 NOTE — OB RN DELIVERY SUMMARY - NS_SEPSISRSKCALC_OBGYN_ALL_OB_FT
EOS calculated successfully. EOS Risk Factor: 0.5/1000 live births ([facility] national incidence); GA=40w6d; Temp=98.1; ROM=20.35; GBS='Negative'; Antibiotics='Broad spectrum antibiotics 2-3.9 hrs prior to birth'   EOS calculated successfully. EOS Risk Factor: 0.5/1000 live births ([facility] [place][note 2] national incidence); GA=40w6d; Temp=103.5; ROM=20.35; GBS='Negative'; Antibiotics='Broad spectrum antibiotics 2-3.9 hrs prior to birth'

## 2025-05-04 NOTE — OB PROVIDER LABOR PROGRESS NOTE - NS_OBIHIFHRDETAILS_OBGYN_ALL_OB_FT
155 baseline, moderate variability, + accels, -decels
Baseline 150s, moderate variability, multiple late decelerations sobia 90-100s, multiple variables
120 baseline, moderate variability, + accels, -decels
Baseline 110s, moderate variability, + accels, ocasional lates
125, moderate, +accelerations, occasional early decelerations
150bpm baseline, moderate variability (periods of minimal), no accelerations, variable and occasional late decelerations
120 baseline, moderate variability, + accels, -decels

## 2025-05-04 NOTE — OB PROVIDER DELIVERY SUMMARY - NSSELHIDDEN_OBGYN_ALL_OB_FT
[NS_DeliveryAttending1_OBGYN_ALL_OB_FT:[id number]=],[NS_DeliveryAssist1_OBGYN_ALL_OB_FT:[id number]=]

## 2025-05-04 NOTE — CHART NOTE - NSCHARTNOTEFT_GEN_A_CORE
Patient had a witnessed fall - nurse and OR tech in room. Assisted to floor. Patient was voiding and when she stood up she felt lightheaded. No LOC. No head trauma.   Patient was assisted to lying position and then brought into wheel chair and to the bed.  Vitals stable  Blood loss during delivery 150cc  No heavy vaginal bleeding noted  Patient to receive IVF bolus and repeat vitals    Code Fall called

## 2025-05-04 NOTE — OB NEONATOLOGY/PEDIATRICIAN DELIVERY SUMMARY - NSPEDSNEONOTESA_OBGYN_ALL_OB_FT
OB requested me to attend vaginal delivery at 40.4 weeks due NRFHT, maternal fever, and heavy meconium. OB requested me to attend vaginal delivery at 40.4 weeks due NRFHT, maternal fever, and heavy meconium. The mother is 23 y/o, , A+, HIV NR, HBsAg NR, RPR NR, HCV NR, GBS NR, R-NI. AROM on 5/3/25 @ 11:5am with MSAF. She developed fever 39.7 at 4:48am, and rec' d IV Antibiotics  L & D: Vertex, heavy meconium, bulb and deep suctioned, PPV x 1 min for poor resp effort, started retractions, and grunting, CPAP continued. APGAR 6 & 9, BW: 4040    Assessment: full term appropriate for gestational age, BB,  with respiratory distress, heavy meconium, Presumed sepsis due to matenal fever and NRFHT.   Plan: admit to NICU for further management. Both parents were updated in L & D

## 2025-05-04 NOTE — OB PROVIDER DELIVERY SUMMARY - AS DELIV COMPLICATIONS OB
arrested at 8cm for 8 hours/abnormal active phase labor/abnormal fetal heart rate tracing/chorioamnionitis/maternal fever/shoulder dystocia/meconium stained fluid

## 2025-05-04 NOTE — OB PROVIDER LABOR PROGRESS NOTE - NS_SUBJECTIVE/OBJECTIVE_OBGYN_ALL_OB_FT
MD 1 note
Patient feeling increased pressure.
Patient feeling pressure and nausea/vomiting
Patient comfortable after epidural
MD 1 note  late entry due to acuity of the floor
Patient with fetal tachycardia. Rectal temp of 103F, maternal tachycardia to 120s now meeting  sepsis criteria
Patient evaluated due to category II tracing

## 2025-05-05 LAB
ALBUMIN SERPL ELPH-MCNC: 1.8 G/DL — LOW (ref 3.3–5)
ALP SERPL-CCNC: 155 U/L — HIGH (ref 40–120)
ALT FLD-CCNC: 15 U/L — SIGNIFICANT CHANGE UP (ref 12–78)
ANION GAP SERPL CALC-SCNC: 5 MMOL/L — SIGNIFICANT CHANGE UP (ref 5–17)
AST SERPL-CCNC: 19 U/L — SIGNIFICANT CHANGE UP (ref 15–37)
BILIRUB SERPL-MCNC: 0.4 MG/DL — SIGNIFICANT CHANGE UP (ref 0.2–1.2)
BUN SERPL-MCNC: 15 MG/DL — SIGNIFICANT CHANGE UP (ref 7–23)
CALCIUM SERPL-MCNC: 8.6 MG/DL — SIGNIFICANT CHANGE UP (ref 8.5–10.1)
CHLORIDE SERPL-SCNC: 112 MMOL/L — HIGH (ref 96–108)
CO2 SERPL-SCNC: 23 MMOL/L — SIGNIFICANT CHANGE UP (ref 22–31)
CREAT SERPL-MCNC: 0.82 MG/DL — SIGNIFICANT CHANGE UP (ref 0.5–1.3)
EGFR: 103 ML/MIN/1.73M2 — SIGNIFICANT CHANGE UP
EGFR: 103 ML/MIN/1.73M2 — SIGNIFICANT CHANGE UP
GLUCOSE SERPL-MCNC: 74 MG/DL — SIGNIFICANT CHANGE UP (ref 70–99)
HCT VFR BLD CALC: 29.1 % — LOW (ref 34.5–45)
HGB BLD-MCNC: 9.3 G/DL — LOW (ref 11.5–15.5)
MCHC RBC-ENTMCNC: 27.7 PG — SIGNIFICANT CHANGE UP (ref 27–34)
MCHC RBC-ENTMCNC: 32 G/DL — SIGNIFICANT CHANGE UP (ref 32–36)
MCV RBC AUTO: 86.6 FL — SIGNIFICANT CHANGE UP (ref 80–100)
NRBC # BLD AUTO: 0 K/UL — SIGNIFICANT CHANGE UP (ref 0–0)
NRBC # FLD: 0 K/UL — SIGNIFICANT CHANGE UP (ref 0–0)
NRBC BLD AUTO-RTO: 0 /100 WBCS — SIGNIFICANT CHANGE UP (ref 0–0)
PLATELET # BLD AUTO: 257 K/UL — SIGNIFICANT CHANGE UP (ref 150–400)
PMV BLD: 10.9 FL — SIGNIFICANT CHANGE UP (ref 7–13)
POTASSIUM SERPL-MCNC: 3.7 MMOL/L — SIGNIFICANT CHANGE UP (ref 3.5–5.3)
POTASSIUM SERPL-SCNC: 3.7 MMOL/L — SIGNIFICANT CHANGE UP (ref 3.5–5.3)
PROT SERPL-MCNC: 5 GM/DL — LOW (ref 6–8.3)
RBC # BLD: 3.36 M/UL — LOW (ref 3.8–5.2)
RBC # FLD: 14.6 % — HIGH (ref 10.3–14.5)
SODIUM SERPL-SCNC: 140 MMOL/L — SIGNIFICANT CHANGE UP (ref 135–145)
WBC # BLD: 24.34 K/UL — HIGH (ref 3.8–10.5)
WBC # FLD AUTO: 24.34 K/UL — HIGH (ref 3.8–10.5)

## 2025-05-05 RX ORDER — SODIUM CHLORIDE 9 G/1000ML
1000 INJECTION, SOLUTION INTRAVENOUS
Refills: 0 | Status: DISCONTINUED | OUTPATIENT
Start: 2025-05-05 | End: 2025-05-05

## 2025-05-05 RX ORDER — ACETAMINOPHEN 500 MG/5ML
3 LIQUID (ML) ORAL
Qty: 0 | Refills: 0 | DISCHARGE
Start: 2025-05-05

## 2025-05-05 RX ORDER — ACETAMINOPHEN 500 MG/5ML
975 LIQUID (ML) ORAL
Refills: 0 | Status: DISCONTINUED | OUTPATIENT
Start: 2025-05-05 | End: 2025-05-07

## 2025-05-05 RX ADMIN — Medication 975 MILLIGRAM(S): at 21:35

## 2025-05-05 RX ADMIN — Medication 3 MILLILITER(S): at 22:00

## 2025-05-05 RX ADMIN — Medication 975 MILLIGRAM(S): at 22:05

## 2025-05-05 RX ADMIN — Medication 25 GRAM(S): at 13:53

## 2025-05-05 RX ADMIN — Medication 25 GRAM(S): at 05:50

## 2025-05-05 RX ADMIN — Medication 975 MILLIGRAM(S): at 14:45

## 2025-05-05 RX ADMIN — Medication 975 MILLIGRAM(S): at 09:39

## 2025-05-05 RX ADMIN — SODIUM CHLORIDE 1000 MILLILITER(S): 9 INJECTION, SOLUTION INTRAVENOUS at 04:58

## 2025-05-05 RX ADMIN — Medication 975 MILLIGRAM(S): at 10:20

## 2025-05-05 RX ADMIN — Medication 25 GRAM(S): at 22:11

## 2025-05-05 RX ADMIN — Medication 1 TABLET(S): at 11:57

## 2025-05-05 NOTE — DISCHARGE NOTE OB - FINANCIAL ASSISTANCE
[facility] provides services at a reduced cost to those who are determined to be eligible through [facility]’s financial assistance program. Information regarding [facility]’s financial assistance program can be found by going to [email or web address] or by calling [phone].

## 2025-05-05 NOTE — PROGRESS NOTE ADULT - SUBJECTIVE AND OBJECTIVE BOX
KEISHA HERNÁNDEZ is a 23yo  now PPD#1 s/p spontaneous vaginal delivery at 40 weeks 3 days gestation ; hospital course complicated by chorio/sepsis for mat fever 103, tachycardia on zosyn, shoulder dystocia, CODE 100, rectal cytotec, Code Fall.    S:    The patient has no complaints.  Pain controlled with current medications.   She is ambulating without difficulty and tolerating PO  She is formula feeding.   + flatus/+BM/+ voiding     O:    T(C): 36.9 (25 @ 03:49), Max: 38 (25 @ 11:00)  HR: 73 (25 @ 03:49) (73 - 112)  BP: 125/85 (25 @ 03:49) (100/59 - 134/83)  RR: 18 (25 @ 03:49) (18 - 18)  SpO2: 97% (25 @ 03:49) (95% - 98%)    Gen: NAD, AOx3  CV: RRR  Pulm: CTAB  Breast: nontender, non-engorged   Abdomen:  soft, non-tender, non-distended, +bowel sounds.  Uterus:  Fundus firm below umbilicus  VE:  +lochia  Ext:  Non-tender.                          9.3    24.34 )-----------( 257      ( 05 May 2025 07:17 )             29.1         140  |  112[H]  |  15  ----------------------------<  74  3.7   |  23  |  0.82    Ca    8.6      05 May 2025 07:17    TPro  5.0[L]  /  Alb  1.8[L]  /  TBili  0.4  /  DBili  x   /  AST  19  /  ALT  15  /  AlkPhos  155[H]  -      A/P:  KEISHA HERNÁNDEZ is a 23yo  now PPD#1 s/p spontaneous vaginal delivery at 40 weeks 3 days gestation ; hospital course complicated by chorio/sepsis for mat fever 103, tachycardia on zosyn, shoulder dystocia, CODE 100, rectal cytotec, Code Fall.  -Vital signs stable  -Postpartum H&H   -Voiding, tolerating PO, bowel function nml   -Advance care as tolerated   -Continue routine postpartum care and education.  -Healthy male infant, /declines circumcision.   KEISHA HERNÁNDEZ is a 23yo  now PPD#1 s/p spontaneous vaginal delivery at 40 weeks 3 days gestation ; hospital course complicated by chorio/sepsis for mat fever 103, tachycardia on zosyn, shoulder dystocia, CODE 100, rectal cytotec, Code Fall.    S:    The patient has no complaints.  Pain controlled with current medications.   She is ambulating without difficulty and tolerating PO  She is formula feeding.   + flatus/+BM/+ voiding     O:    T(C): 36.9 (25 @ 03:49), Max: 38 (25 @ 11:00)  HR: 73 (25 @ 03:49) (73 - 112)  BP: 125/85 (25 @ 03:49) (100/59 - 134/83)  RR: 18 (25 @ 03:49) (18 - 18)  SpO2: 97% (25 @ 03:49) (95% - 98%)    Gen: NAD, AOx3  CV: RRR  Pulm: CTAB  Breast: nontender, non-engorged   Abdomen:  soft, non-tender, non-distended, +bowel sounds.  Uterus:  Fundus firm below umbilicus  VE:  +lochia  Ext:  Non-tender.                          9.3    24.34 )-----------( 257      ( 05 May 2025 07:17 )             29.1         140  |  112[H]  |  15  ----------------------------<  74  3.7   |  23  |  0.82    Ca    8.6      05 May 2025 07:17    TPro  5.0[L]  /  Alb  1.8[L]  /  TBili  0.4  /  DBili  x   /  AST  19  /  ALT  15  /  AlkPhos  155[H]  -      A/P:  KEISHA HERNÁNDEZ is a 23yo  now PPD#1 s/p spontaneous vaginal delivery at 40 weeks 3 days gestation ; hospital course complicated by chorio/sepsis for mat fever 103, tachycardia on zosyn, shoulder dystocia, CODE 100, rectal cytotec, Code Fall.  -Vital signs stable  -WBC rising 18 > 24 today  -Continue Zosyn for another day  -Continue IVF @ 125cc/hr while on zosyn  -Collect urine cx  -F/u blood cultures  -Postpartum H&H stable 10.0 > 9.3  -Cr improved from 1.29 > 0.82  -Voiding, tolerating PO, bowel function nml   -Advance care as tolerated   -Continue routine postpartum care and education.  -Healthy male infant, declines circumcision.   KEISHA HERNÁNDEZ is a 25yo  now PPD#1 s/p spontaneous vaginal delivery at 40 weeks 3 days gestation ; hospital course complicated by chorio/sepsis for mat fever 103, tachycardia on zosyn, shoulder dystocia, CODE 100, rectal cytotec, Code Fall.    S:    The patient has no complaints.  Pain controlled with current medications.   She is ambulating without difficulty and tolerating PO  She is formula feeding.   + flatus/+BM/+ voiding     O:    T(C): 36.9 (25 @ 03:49), Max: 38 (25 @ 11:00)  HR: 73 (25 @ 03:49) (73 - 112)  BP: 125/85 (25 @ 03:49) (100/59 - 134/83)  RR: 18 (25 @ 03:49) (18 - 18)  SpO2: 97% (25 @ 03:49) (95% - 98%)    Gen: NAD, AOx3  CV: RRR  Pulm: CTAB  Breast: nontender, non-engorged   Abdomen:  soft, non-tender, non-distended, +bowel sounds.  Uterus:  Fundus firm below umbilicus  VE:  +lochia  Ext:  Non-tender.                          9.3    24.34 )-----------( 257      ( 05 May 2025 07:17 )             29.1         140  |  112[H]  |  15  ----------------------------<  74  3.7   |  23  |  0.82    Ca    8.6      05 May 2025 07:17    TPro  5.0[L]  /  Alb  1.8[L]  /  TBili  0.4  /  DBili  x   /  AST  19  /  ALT  15  /  AlkPhos  155[H]  -      A/P:  KEISHA HERNÁNDEZ is a 25yo  now PPD#1 s/p spontaneous vaginal delivery at 40 weeks 3 days gestation ; hospital course complicated by chorio/sepsis for mat fever 103, tachycardia on zosyn, shoulder dystocia, CODE 100, rectal cytotec, Code Fall.  -Vital signs stable  -WBC rising 18 > 24 today  -Continue Zosyn for another 24 hrs  -Continue IVF @ 125cc/hr while on zosyn  -Collect urine cx  -F/u blood cultures  -Postpartum H&H stable 10.0 > 9.3  -Cr improved from 1.29 > 0.82  -Voiding, tolerating PO, bowel function nml   -Advance care as tolerated   -Healthy male infant, declines circumcision.  -Continue routine postpartum care and education.  -Dispo: Patient to be discharged when meeting all postpartum and postoperative milestones and pending attending approval. KEISHA HERNÁNDEZ is a 23 yo  now PPD#1 s/p spontaneous vaginal delivery at 40 weeks 3 days gestation ; hospital course complicated by chorio/sepsis for mat fever 103, tachycardia on zosyn, shoulder dystocia, CODE 100, rectal cytotec, Code Fall.    S:    The patient has no complaints.  Pain controlled with current medications.   She is ambulating without difficulty and tolerating PO  She is formula feeding.   + flatus/+BM/+ voiding     O:    T(C): 36.9 (25 @ 03:49), Max: 38 (25 @ 11:00)  HR: 73 (25 @ 03:49) (73 - 112)  BP: 125/85 (25 @ 03:49) (100/59 - 134/83)  RR: 18 (25 @ 03:49) (18 - 18)  SpO2: 97% (25 @ 03:49) (95% - 98%)    Gen: NAD, AOx3  CV: RRR  Pulm: CTAB  Breast: nontender, non-engorged   Abdomen:  soft, non-tender, non-distended, +bowel sounds.  Uterus:  Fundus firm below umbilicus  VE:  +lochia  Ext:  Non-tender.                          9.3    24.34 )-----------( 257      ( 05 May 2025 07:17 )             29.1         140  |  112[H]  |  15  ----------------------------<  74  3.7   |  23  |  0.82    Ca    8.6      05 May 2025 07:17    TPro  5.0[L]  /  Alb  1.8[L]  /  TBili  0.4  /  DBili  x   /  AST  19  /  ALT  15  /  AlkPhos  155[H]  -      A/P:  KEISHA HERNÁNDEZ is a 23yo  now PPD#1 s/p spontaneous vaginal delivery at 40 weeks 3 days gestation ; hospital course complicated by chorio/sepsis for mat fever 103, tachycardia on zosyn, shoulder dystocia, CODE 100, rectal cytotec, Code Fall.  -Vital signs stable  -WBC rising 18 > 24 today  -Continue Zosyn for another 24 hrs  -Continue IVF @ 125cc/hr while on zosyn  -Collect urine cx  -F/u blood cultures  -Postpartum H&H stable 10.0 > 9.3  -Cr improved from 1.29 > 0.82  -Voiding, tolerating PO, bowel function nml   -Advance care as tolerated   -Healthy male infant, declines circumcision.  -Continue routine postpartum care and education.  -Dispo: Patient to be discharged when meeting all postpartum and postoperative milestones and pending attending approval.

## 2025-05-05 NOTE — DISCHARGE NOTE OB - PATIENT PORTAL LINK FT
You can access the FollowMyHealth Patient Portal offered by [facility] by registering at the following website: [email or web address]. By joining [employer]’s FollowMyHealth portal, you will also be able to view your health information using other applications (apps) compatible with our system.

## 2025-05-05 NOTE — DISCHARGE NOTE OB - PLAN OF CARE
1) Please take Tylenol as needed for pain as prescribed.  2) Nothing in the vagina for 6 weeks (including no sex, no tampons, and no douching).  3) Please call your doctor for a follow up your postpartum appointment in 2 weeks.  4) Please continue taking vitamins postpartum. Take iron and colace for acute blood loss anemia.  5) Please call the office sooner if you have heavy vaginal bleeding, severe abdominal pain, or fever > 100.4F.  6) You may resume regular daily activity as tolerated

## 2025-05-05 NOTE — DISCHARGE NOTE OB - CARE PROVIDER_API CALL
Pamela Mclain  Obstetrics and Gynecology  [address] [place] [address]  Phone: [phone]  Fax: [phone]  Follow Up Time:

## 2025-05-05 NOTE — PROGRESS NOTE ADULT - ATTENDING COMMENTS
patient voiding and ambulating with no issues, reports light lochia, WBC 24 today to continue Zosyn for now with lV hydration till tomorrow, blood/urine culture results pending, vitals stable, has remained afebrile overnight. patient voiding and ambulating with no issues, reports light lochia, WBC 24 today to continue Zosyn for now with lV hydration till tomorrow, blood/urine culture results pending, vitals stable, has remained afebrile overnight. Baby in NICU. patient voiding and ambulating with no issues, reports light lochia, WBC 24 today to continue Zosyn for now with lV hydration till tomorrow, blood/urine culture results pending, vitals stable, has remained afebrile overnight, Cr improved. Baby in NICU.

## 2025-05-05 NOTE — DISCHARGE NOTE OB - CARE PLAN
1 Principal Discharge DX:	 (normal spontaneous vaginal delivery)  Assessment and plan of treatment:	1) Please take Tylenol as needed for pain as prescribed.  2) Nothing in the vagina for 6 weeks (including no sex, no tampons, and no douching).  3) Please call your doctor for a follow up your postpartum appointment in 2 weeks.  4) Please continue taking vitamins postpartum. Take iron and colace for acute blood loss anemia.  5) Please call the office sooner if you have heavy vaginal bleeding, severe abdominal pain, or fever > 100.4F.  6) You may resume regular daily activity as tolerated

## 2025-05-05 NOTE — DISCHARGE NOTE OB - HOSPITAL COURSE
25yo  underwent a spontaneous vaginal delivery.at 40 weeks 3 days gestation, birth complicated by shoulder dystocia and CODE 100. She received rectal cytotec ; Hospital course complicated by chorio/sepsis for fever 103 and she was treated with IV zosyn and IVF. She had FAYE with Cr 1.29, which resolved. She was a Code Fall. Her pain is well controlled with PRN medication. She has no difficulty with ambulation, voiding, or PO intake. Lab values and vital signs are within normal limits prior to discharge.        23yo  underwent a spontaneous vaginal delivery.at 40 weeks 3 days gestation, birth complicated by shoulder dystocia and CODE 100. She received rectal cytotec ; Hospital course complicated by chorio/sepsis for fever 103 and she was treated with IV zosyn and IVF. Blood cx and Urine cx obtained. She had FAYE with Cr 1.29, which resolved. She was a Code Fall. Her pain is well controlled with PRN medication. She has no difficulty with ambulation, voiding, or PO intake. Lab values and vital signs are within normal limits prior to discharge.        23yo  underwent a spontaneous vaginal delivery at 40 weeks 3 days gestation, birth complicated by shoulder dystocia and CODE 100. She received rectal cytotec ; Hospital course complicated by chorio/sepsis for fever 103 and she was treated with IV zosyn and IVF. Blood cx and Urine cx obtained. She had FAYE with Cr 1.29, which resolved. She was a Code Fall. Her pain is well controlled with PRN medication. She has no difficulty with ambulation, voiding, or PO intake. Uptrending BP's on post partum 140's/90's. Started on Labetalol 100mg bid. Discharge for this evening pending BP's.

## 2025-05-05 NOTE — DISCHARGE NOTE OB - MEDICATION SUMMARY - MEDICATIONS TO TAKE
I will START or STAY ON the medications listed below when I get home from the hospital:    acetaminophen 325 mg oral tablet  -- 3 tab(s) by mouth every 6 hours  -- Indication: For  (normal spontaneous vaginal delivery)   I will START or STAY ON the medications listed below when I get home from the hospital:    acetaminophen 325 mg oral tablet  -- 3 tab(s) by mouth every 6 hours  -- Indication: For  (normal spontaneous vaginal delivery)    labetalol 100 mg oral tablet  -- 1 tab(s) by mouth 2 times a day  -- Indication: For Hypertension    I will START or STAY ON the medications listed below when I get home from the hospital:    blood pressure cuff  -- please record your blood pressure two times per day and call your provider if >160/110  -- Indication: For hypertension    acetaminophen 325 mg oral tablet  -- 3 tab(s) by mouth every 6 hours  -- Indication: For  (normal spontaneous vaginal delivery)    labetalol 100 mg oral tablet  -- 1 tab(s) by mouth 2 times a day do not take if your blood pressure is <110/60mmHg  -- Indication: For hypertension

## 2025-05-06 LAB
ALBUMIN SERPL ELPH-MCNC: 1.7 G/DL — LOW (ref 3.3–5)
ALP SERPL-CCNC: 167 U/L — HIGH (ref 40–120)
ALT FLD-CCNC: 44 U/L — SIGNIFICANT CHANGE UP (ref 12–78)
ANION GAP SERPL CALC-SCNC: 6 MMOL/L — SIGNIFICANT CHANGE UP (ref 5–17)
AST SERPL-CCNC: 53 U/L — HIGH (ref 15–37)
BILIRUB SERPL-MCNC: 0.3 MG/DL — SIGNIFICANT CHANGE UP (ref 0.2–1.2)
BUN SERPL-MCNC: 13 MG/DL — SIGNIFICANT CHANGE UP (ref 7–23)
CALCIUM SERPL-MCNC: 8.5 MG/DL — SIGNIFICANT CHANGE UP (ref 8.5–10.1)
CHLORIDE SERPL-SCNC: 111 MMOL/L — HIGH (ref 96–108)
CO2 SERPL-SCNC: 23 MMOL/L — SIGNIFICANT CHANGE UP (ref 22–31)
CREAT SERPL-MCNC: 0.54 MG/DL — SIGNIFICANT CHANGE UP (ref 0.5–1.3)
CULTURE RESULTS: NO GROWTH — SIGNIFICANT CHANGE UP
EGFR: 133 ML/MIN/1.73M2 — SIGNIFICANT CHANGE UP
EGFR: 133 ML/MIN/1.73M2 — SIGNIFICANT CHANGE UP
GLUCOSE SERPL-MCNC: 75 MG/DL — SIGNIFICANT CHANGE UP (ref 70–99)
HCT VFR BLD CALC: 28 % — LOW (ref 34.5–45)
HGB BLD-MCNC: 8.9 G/DL — LOW (ref 11.5–15.5)
MCHC RBC-ENTMCNC: 27.7 PG — SIGNIFICANT CHANGE UP (ref 27–34)
MCHC RBC-ENTMCNC: 31.8 G/DL — LOW (ref 32–36)
MCV RBC AUTO: 87.2 FL — SIGNIFICANT CHANGE UP (ref 80–100)
NRBC # BLD AUTO: 0.02 K/UL — HIGH (ref 0–0)
NRBC # FLD: 0.02 K/UL — HIGH (ref 0–0)
NRBC BLD AUTO-RTO: 0 /100 WBCS — SIGNIFICANT CHANGE UP (ref 0–0)
PLATELET # BLD AUTO: 256 K/UL — SIGNIFICANT CHANGE UP (ref 150–400)
PMV BLD: 10.8 FL — SIGNIFICANT CHANGE UP (ref 7–13)
POTASSIUM SERPL-MCNC: 4 MMOL/L — SIGNIFICANT CHANGE UP (ref 3.5–5.3)
POTASSIUM SERPL-SCNC: 4 MMOL/L — SIGNIFICANT CHANGE UP (ref 3.5–5.3)
PROT SERPL-MCNC: 5.2 GM/DL — LOW (ref 6–8.3)
RBC # BLD: 3.21 M/UL — LOW (ref 3.8–5.2)
RBC # FLD: 14.6 % — HIGH (ref 10.3–14.5)
SODIUM SERPL-SCNC: 140 MMOL/L — SIGNIFICANT CHANGE UP (ref 135–145)
SPECIMEN SOURCE: SIGNIFICANT CHANGE UP
WBC # BLD: 18.16 K/UL — HIGH (ref 3.8–10.5)
WBC # FLD AUTO: 18.16 K/UL — HIGH (ref 3.8–10.5)

## 2025-05-06 RX ADMIN — Medication 25 GRAM(S): at 06:16

## 2025-05-06 RX ADMIN — Medication 975 MILLIGRAM(S): at 21:13

## 2025-05-06 RX ADMIN — Medication 975 MILLIGRAM(S): at 09:51

## 2025-05-06 RX ADMIN — Medication 25 GRAM(S): at 14:37

## 2025-05-06 RX ADMIN — Medication 1 TABLET(S): at 11:47

## 2025-05-06 RX ADMIN — Medication 975 MILLIGRAM(S): at 15:10

## 2025-05-06 RX ADMIN — Medication 975 MILLIGRAM(S): at 03:17

## 2025-05-06 RX ADMIN — Medication 975 MILLIGRAM(S): at 09:20

## 2025-05-06 RX ADMIN — Medication 975 MILLIGRAM(S): at 21:43

## 2025-05-06 RX ADMIN — Medication 3 MILLILITER(S): at 22:00

## 2025-05-06 RX ADMIN — Medication 975 MILLIGRAM(S): at 03:47

## 2025-05-06 RX ADMIN — Medication 3 MILLILITER(S): at 00:07

## 2025-05-06 RX ADMIN — Medication 975 MILLIGRAM(S): at 14:37

## 2025-05-06 NOTE — PROGRESS NOTE ADULT - SUBJECTIVE AND OBJECTIVE BOX
KEISHA HERNÁNDEZ is a 24y  now PPD# 2 s/p  at 40w3d GA, complicated by chorioamionitis with code sepsis. Delivery complicated by shoulder dystocia with code 100 and she received rectal cytotec.    Subjective:    No acute events overnight.  Patient has no complaints.  Pain is well controlled.  +flatus, +BM, + voiding.  Ambulating and tolerating PO.  Appropriate lochia.  Denies fever, chills, nausea, and vomiting.  She denies lightheadedness, dizziness, HA, blurry vision, palpitations, chest pain and SOB.     Objective:    T(C): 36.6 (25 @ 05:28), Max: 36.7 (25 @ 09:04)  HR: 58 (25 @ :28) (58 - 74)  BP: 125/88 (25 @ 05:28) (109/72 - 135/84)  RR: 18 (25 @ 05:28) (16 - 18)  SpO2: 98% (25 @ 05:28) (94% - 100%)    Physical exam:  General: AOx3, NAD.  Pulm: Normal respiratory effort on RA  Abdomen: Soft, appropriately tender to palpitation, fundus firm.  Ext: No DVT signs, warm extremities.                          9.3    24.34 )-----------( 257      ( 05 May 2025 07:17 )             29.1         140  |  112[H]  |  15  ----------------------------<  74  3.7   |  23  |  0.82    Ca    8.6      05 May 2025 07:17    TPro  5.0[L]  /  Alb  1.8[L]  /  TBili  0.4  /  DBili  x   /  AST  19  /  ALT  15  /  AlkPhos  155[H]

## 2025-05-06 NOTE — PROGRESS NOTE ADULT - ASSESSMENT
Assessment/Plan:  KEISHA HERNÁNDEZ is a 24y  now PPD# 2 s/p  at 40w3d GA, complicated by chorioamionitis with code sepsis. Delivery complicated by shoulder dystocia with code 100 and she received rectal cytotec.    #Routine post partum care  - Stable, doing well postpartum  - Hgb 10 > 9.3  - Pain: well controlled on PO pain meds  - GI: regular diet, normal bowel function  - : voiding, lochia decreasing  - DVT ppx: ambulation encouraged  - Healthy baby     #Chorioamnionitis  - Afebrile overnight  - Zosyn q8h  - WBC  18 > 24, lactate 1.7  - BCX no growth at 24hrs  - UCX pending results    Dispo: Pt is stable, doing well and meeting all postpartum milestones. Possible discharge to home today pending attending approval.     Assessment/Plan:  KEISHA HERNÁNDEZ is a 24y  now PPD# 2 s/p  at 40w3d GA, complicated by chorioamionitis with code sepsis. Delivery complicated by shoulder dystocia with code 100 and she received rectal cytotec.    #Routine post partum care  - Stable, doing well postpartum  - Hgb 10 > 9.3  - Pain: well controlled on PO pain meds  - GI: regular diet, normal bowel function  - : voiding, lochia decreasing  - DVT ppx: ambulation encouraged  - Healthy baby   - Declines PPBC    #Chorioamnionitis  - Afebrile overnight  - Zosyn q8h  - WBC  18 > 24, lactate 1.7  - BCX no growth at 24hrs  - UCX pending results    Dispo: Pt is stable, doing well and meeting all postpartum milestones. Possible discharge to home today pending attending approval.

## 2025-05-06 NOTE — PROGRESS NOTE ADULT - ATTENDING COMMENTS
MD1 Note    PPD 2  Pt is doing well.  VSS  Fundus soft and nontender.  When to call md disc.  Will follow up to see if blood cultures neg x 48 hours.  Pt uncertain about contraception at this time  [facility] [place]  Adv pt to let me know of which contraception she would like later today.  DC instructions reviewed and follow up disc.  RTO in 4-6 weeks    XOCHITLE MD1 Note    PPD 2  Pt is doing well.  VSS  Fundus soft and nontender.  When to call md disc  Blood cultures neg x 48 hours.  Pt uncertain about contraception at this time  [facility] in [place]  Wishes to disc contraception at her postpartum visit.  Adv pt to let me know of which contraception she would like later today.  DC instructions reviewed and follow up disc.  RTO in 4-6 weeks    COLLINS

## 2025-05-07 VITALS
RESPIRATION RATE: 18 BRPM | DIASTOLIC BLOOD PRESSURE: 75 MMHG | TEMPERATURE: 98 F | SYSTOLIC BLOOD PRESSURE: 123 MMHG | HEART RATE: 75 BPM | OXYGEN SATURATION: 98 %

## 2025-05-07 RX ORDER — LABETALOL HYDROCHLORIDE 200 MG/1
1 TABLET, FILM COATED ORAL
Qty: 60 | Refills: 0
Start: 2025-05-07 | End: 2025-06-05

## 2025-05-07 RX ORDER — LABETALOL HYDROCHLORIDE 200 MG/1
100 TABLET, FILM COATED ORAL
Refills: 0 | Status: DISCONTINUED | OUTPATIENT
Start: 2025-05-07 | End: 2025-05-07

## 2025-05-07 RX ADMIN — Medication 1 TABLET(S): at 09:09

## 2025-05-07 RX ADMIN — Medication 975 MILLIGRAM(S): at 09:09

## 2025-05-07 RX ADMIN — Medication 0.5 MILLILITER(S): at 10:42

## 2025-05-07 RX ADMIN — Medication 975 MILLIGRAM(S): at 02:57

## 2025-05-07 RX ADMIN — Medication 975 MILLIGRAM(S): at 15:21

## 2025-05-07 RX ADMIN — Medication 975 MILLIGRAM(S): at 09:48

## 2025-05-07 RX ADMIN — Medication 975 MILLIGRAM(S): at 03:27

## 2025-05-07 RX ADMIN — LABETALOL HYDROCHLORIDE 100 MILLIGRAM(S): 200 TABLET, FILM COATED ORAL at 09:09

## 2025-05-07 NOTE — PROGRESS NOTE ADULT - SUBJECTIVE AND OBJECTIVE BOX
KEISHA HERNÁNDEZ is a 24y  now PPD#3 s/p  at 40w3d GA, complicated by chorioamionitis with code sepsis. Delivery complicated by shoulder dystocia with code 100 and she received rectal cytotec.    S:    No acute events overnight.   The patient has no complaints.  Pain controlled with current treatment regimen.   She is ambulating without difficulty and tolerating PO.   + flatus/-BM/+ voiding   She endorses appropriate lochia, which is decreasing.   She is breastfeeding without difficulty.   She denies fevers, chills, nausea and vomiting.   She denies lightheadedness, dizziness, palpitations, chest pain and SOB.     O:    T(C): 36.7 (25 @ 04:14), Max: 36.9 (25 @ 12:47)  HR: 65 (25 @ 04:14) (57 - 65)  BP: 146/93 (25 @ 04:14) (126/85 - 147/95)  RR: 18 (25 @ 04:14) (18 - 18)  SpO2: 98% (25 @ 04:14) (96% - 98%)    Gen: NAD, AOx3, resting comfortably on room air   Abdomen:  Soft, non-tender, non-distended  Uterus:  Fundus firm below umbilicus  VE:  Expected lochia  Ext:  b/l LE non-tender                           8.9    18.16 )-----------( 256      ( 06 May 2025 09:18 )             28.0         140  |  111[H]  |  13  ----------------------------<  75  4.0   |  23  |  0.54    Ca    8.5      06 May 2025 09:18    TPro  5.2[L]  /  Alb  1.7[L]  /  TBili  0.3  /  DBili  x   /  AST  53[H]  /  ALT  44  /  AlkPhos  167[H]  -      A/P:    -Vital signs stable  -Hgb: _ -> AM labs pending   -Voiding, tolerating PO  -Advance care as tolerated   -Continue routine postpartum care and education  -Healthy male infant, desires/declines circumcision  -Healthy female infant  -Dispo: Anticipate discharge to home pending attending approval.  -Dispo: Patient to be discharged when meeting all postpartum milestones and pending attending approval.        S:    No acute events overnight.   The patient has no complaints.  Pain controlled with current treatment regimen.   She is ambulating without difficulty and tolerating PO.   + flatus//+ voiding   She endorses appropriate lochia, which is decreasing.   She is bottlefeeding without difficulty.   She denies fevers, chills, nausea and vomiting.   She denies lightheadedness, dizziness, palpitations, chest pain and SOB.     O:    T(C): 36.7 (05-07-25 @ 04:14), Max: 36.9 (05-06-25 @ 12:47)  HR: 65 (05-07-25 @ 04:14) (57 - 65)  BP: 146/93 (05-07-25 @ 04:14) (126/85 - 147/95)  RR: 18 (05-07-25 @ 04:14) (18 - 18)  SpO2: 98% (05-07-25 @ 04:14) (96% - 98%)    Gen: NAD, AOx3, resting comfortably on room air   Abdomen:  Soft, non-tender, non-distended  Uterus:  Fundus firm below umbilicus  VE:  Expected lochia  Ext:  b/l LE non-tender                           8.9    18.16 )-----------( 256      ( 06 May 2025 09:18 )             28.0     05-06    140  |  111[H]  |  13  ----------------------------<  75  4.0   |  23  |  0.54    Ca    8.5      06 May 2025 09:18    TPro  5.2[L]  /  Alb  1.7[L]  /  TBili  0.3  /  DBili  x   /  AST  53[H]  /  ALT  44  /  AlkPhos  167[H]  05-06

## 2025-05-07 NOTE — PROGRESS NOTE ADULT - ASSESSMENT
KEISHA HERNÁNDEZ is a 24y  now PPD#3 s/p  at 40w3d GA, complicated by chorioamionitis with code sepsis. Delivery complicated by shoulder dystocia with code 100 and she received rectal cytotec.    #Routine post partum care   -Vital signs stable, doing well postpartum   -Hgb: 8.9-> AM labs pending  -Voiding, tolerating PO  - GI: regular diet, normal BM   -:  voiding, lochia decreasing   - DVT ppx: ambulation encouraged   -Healthy male infant, declines circumcision  - Declines PPBC at this time, states she will receive BC in 2 wks from [facility]    #Chorioamnionitis   - Afebrile overnight   - completed zosyn   - WBC 24>18  - BCx no growth at 24hrs   - Ucx neg    Dispo: Pt is stable, doing well and meeting all postpartum milestones. Possible discharge to home today pending attending approval.        KEISHA HERNÁNDEZ is a 24y  now PPD#3 s/p  at 40w3d GA, complicated by chorioamionitis with code sepsis. Delivery complicated by shoulder dystocia with code 100 and she received rectal cytotec.    #Routine post partum care   -Vital signs stable, doing well postpartum   -Hgb: 8.9-> AM labs pending  -Voiding, tolerating PO  - GI: regular diet, normal BM   -:  voiding, lochia decreasing   - DVT ppx: ambulation encouraged   -Healthy male infant, declines circumcision  - Declines PPBC at this time, states she will receive BC in 2 wks from [facility]    #Chorioamnionitis   - Afebrile overnight   - completed zosyn   - WBC 24>18  - BCx no growth at 24hrs   - Ucx neg    Dispo: Pt is stable, doing well and meeting all postpartum milestones. Possible discharge to home today pending attending approval.        KEISHA HERNÁNDEZ is a 24y  now PPD#3 s/p  at 40w3d GA, complicated by chorioamionitis with code sepsis. Delivery complicated by shoulder dystocia with code 100 and she received rectal cytotec.    #Routine post partum care   -Vital signs stable, doing well postpartum   -Hgb: 8.9-> AM labs pending  -Voiding, tolerating PO  - GI: regular diet, normal BM   -:  voiding, lochia decreasing   - DVT ppx: ambulation encouraged   -Healthy male infant, declines circumcision  - Declines PPBC at this time, states she will receive BC in 2 wks from [facility]    #Chorioamnionitis   - Afebrile overnight   - completed zosyn   - WBC 24>18  - BCx no growth at 24hrs   - Ucx neg    #Hypertension  - no previous dx of hypertension  - pt asymptomatic   - 147/95 >133/88  - cw labetalol 100mg bid     Dispo: Pt is stable, doing well and meeting all postpartum milestones. Possible discharge to home today pending attending approval.

## 2025-05-07 NOTE — PROGRESS NOTE ADULT - ATTENDING COMMENTS
Reviewed patient chart.  Due to raising blood pressures, new Rx of Labetalol 100mg BID given.  Will continue to observe in house with review this evening for discharge.

## 2025-05-09 LAB
CULTURE RESULTS: SIGNIFICANT CHANGE UP
CULTURE RESULTS: SIGNIFICANT CHANGE UP
SPECIMEN SOURCE: SIGNIFICANT CHANGE UP
SPECIMEN SOURCE: SIGNIFICANT CHANGE UP

## 2025-05-15 DIAGNOSIS — O41.1230 CHORIOAMNIONITIS, THIRD TRIMESTER, NOT APPLICABLE OR UNSPECIFIED: ICD-10-CM

## 2025-05-15 DIAGNOSIS — Z3A.40 40 WEEKS GESTATION OF PREGNANCY: ICD-10-CM

## 2025-05-15 DIAGNOSIS — A41.9 SEPSIS, UNSPECIFIED ORGANISM: ICD-10-CM

## 2025-05-19 LAB — SURGICAL PATHOLOGY STUDY: SIGNIFICANT CHANGE UP

## 2025-05-20 NOTE — PATIENT PROFILE OB - BREAST MILK SUPPORTS STABLE NEWBORN BLOOD SUGAR
[Follow-Up] : a follow-up visit  [FreeTextEntry3] : [facility]LT [FreeTextEntry5] : 2/14/2024 Statement Selected